# Patient Record
Sex: FEMALE | Race: OTHER | Employment: UNEMPLOYED | URBAN - METROPOLITAN AREA
[De-identification: names, ages, dates, MRNs, and addresses within clinical notes are randomized per-mention and may not be internally consistent; named-entity substitution may affect disease eponyms.]

---

## 2017-04-14 ENCOUNTER — OFFICE VISIT (OUTPATIENT)
Dept: FAMILY MEDICINE CLINIC | Age: 57
End: 2017-04-14

## 2017-04-14 VITALS
RESPIRATION RATE: 22 BRPM | HEIGHT: 63 IN | TEMPERATURE: 98.4 F | HEART RATE: 70 BPM | SYSTOLIC BLOOD PRESSURE: 160 MMHG | DIASTOLIC BLOOD PRESSURE: 98 MMHG | WEIGHT: 248.2 LBS | BODY MASS INDEX: 43.98 KG/M2 | OXYGEN SATURATION: 97 %

## 2017-04-14 DIAGNOSIS — J06.9 UPPER RESPIRATORY TRACT INFECTION, UNSPECIFIED TYPE: Primary | ICD-10-CM

## 2017-04-14 DIAGNOSIS — M17.12 PRIMARY OSTEOARTHRITIS OF LEFT KNEE: ICD-10-CM

## 2017-04-14 DIAGNOSIS — Z91.14 NONCOMPLIANCE WITH MEDICATION REGIMEN: ICD-10-CM

## 2017-04-14 DIAGNOSIS — Z91.09 ENVIRONMENTAL ALLERGIES: ICD-10-CM

## 2017-04-14 DIAGNOSIS — I10 ESSENTIAL HYPERTENSION: ICD-10-CM

## 2017-04-14 DIAGNOSIS — J45.40 MODERATE PERSISTENT ASTHMA WITHOUT COMPLICATION: ICD-10-CM

## 2017-04-14 RX ORDER — ALBUTEROL SULFATE 90 UG/1
2 AEROSOL, METERED RESPIRATORY (INHALATION)
Qty: 2 INHALER | Refills: 1 | Status: SHIPPED | OUTPATIENT
Start: 2017-04-14

## 2017-04-14 RX ORDER — BUDESONIDE AND FORMOTEROL FUMARATE DIHYDRATE 160; 4.5 UG/1; UG/1
2 AEROSOL RESPIRATORY (INHALATION) 2 TIMES DAILY
Qty: 3 INHALER | Refills: 1 | Status: SHIPPED | OUTPATIENT
Start: 2017-04-14 | End: 2017-08-04 | Stop reason: CLARIF

## 2017-04-14 RX ORDER — IBUPROFEN 800 MG/1
800 TABLET ORAL
Qty: 90 TAB | Refills: 1 | Status: SHIPPED | OUTPATIENT
Start: 2017-04-14

## 2017-04-14 RX ORDER — HYDROCODONE POLISTIREX AND CHLORPHENIRAMINE POLISTIREX 10; 8 MG/5ML; MG/5ML
5 SUSPENSION, EXTENDED RELEASE ORAL
Qty: 100 ML | Refills: 0 | Status: SHIPPED | OUTPATIENT
Start: 2017-04-14 | End: 2017-07-14 | Stop reason: ALTCHOICE

## 2017-04-14 RX ORDER — MONTELUKAST SODIUM 10 MG/1
10 TABLET ORAL DAILY
Qty: 90 TAB | Refills: 1 | Status: SHIPPED | OUTPATIENT
Start: 2017-04-14 | End: 2017-11-28 | Stop reason: SDUPTHER

## 2017-04-14 RX ORDER — LISINOPRIL AND HYDROCHLOROTHIAZIDE 12.5; 2 MG/1; MG/1
1 TABLET ORAL DAILY
Qty: 180 TAB | Refills: 1 | Status: SHIPPED | OUTPATIENT
Start: 2017-04-14 | End: 2017-07-14 | Stop reason: SDUPTHER

## 2017-04-14 RX ORDER — AMLODIPINE BESYLATE 5 MG/1
5 TABLET ORAL DAILY
Qty: 90 TAB | Refills: 1 | Status: SHIPPED | OUTPATIENT
Start: 2017-04-14 | End: 2017-11-28 | Stop reason: SDUPTHER

## 2017-04-14 RX ORDER — CLARITHROMYCIN 500 MG/1
500 TABLET, FILM COATED ORAL 2 TIMES DAILY
Qty: 20 TAB | Refills: 0 | Status: SHIPPED | OUTPATIENT
Start: 2017-04-14 | End: 2017-04-24

## 2017-04-14 NOTE — PROGRESS NOTES
Keenan Drake is a 55 y.o. female  Chief Complaint   Patient presents with    Sinus Pain     x 1 week    Gland Swelling      1 week     1. Have you been to the ER, urgent care clinic since your last visit? Hospitalized since your last visit? No    2. Have you seen or consulted any other health care providers outside of the [address] [place] [address] since your last visit? Include any pap smears or colon screening. No     Pt states that shes not been taking her BP medication because shes weening herself from it. Only medication that she is taking right now is Ibufropen.

## 2017-04-14 NOTE — LETTER
NOTIFICATION RETURN TO WORK / SCHOOL 
 
4/14/2017 8:41 AM 
 
Ms. Jake Mello [address] [address] To Whom It May Concern: 
 
Jake Mello is currently under the care of [address] [place] [address]. She will return to work/school on: 04/17/17 If there are questions or concerns please have the patient contact our office. Sincerely, Dakota Vanegas MD

## 2017-04-14 NOTE — PROGRESS NOTES
Assessment/Plan:    Daniel Rothman was seen today for sinus pain and gland swelling. Diagnoses and all orders for this visit:    Upper respiratory tract infection, unspecified type  -     chlorpheniramine-HYDROcodone (TUSSIONEX) 10-8 mg/5 mL suspension; Take 5 mL by mouth nightly as needed for Cough. Max Daily Amount: 5 mL. -     clarithromycin (BIAXIN) 500 mg tablet; Take 1 Tab by mouth two (2) times a day for 10 days. Environmental allergies  -     montelukast (SINGULAIR) 10 mg tablet; Take 1 Tab by mouth daily. Essential hypertension  -     lisinopril-hydroCHLOROthiazide (PRINZIDE, ZESTORETIC) 20-12.5 mg per tablet; Take 1 Tab by mouth daily. -     amLODIPine (NORVASC) 5 mg tablet; Take 1 Tab by mouth daily.  -     CBC WITH AUTOMATED DIFF; Future  -     HEPATIC FUNCTION PANEL; Future  -     LIPID PANEL; Future  -     METABOLIC PANEL, BASIC; Future  -     TSH 3RD GENERATION; Future  -     T4, FREE; Future  -     URINALYSIS W/ RFLX MICROSCOPIC; Future  -     VITAMIN D, 25 HYDROXY; Future    Moderate persistent asthma without complication  -     budesonide-formoterol (SYMBICORT) 160-4.5 mcg/actuation HFA inhaler; Take 2 Puffs by inhalation two (2) times a day. -     albuterol (PROVENTIL HFA, VENTOLIN HFA, PROAIR HFA) 90 mcg/actuation inhaler; Take 2 Puffs by inhalation every six (6) hours as needed for Wheezing. Primary osteoarthritis of left knee  -     ibuprofen (MOTRIN) 800 mg tablet; Take 1 Tab by mouth every eight (8) hours as needed for Pain. Pt will return for physical in May. Will be back on BP meds at that time and will recheck BP. The plan was discussed with the patient. The patient verbalized understanding and is in agreement with the plan.   All medication potential side effects were discussed with the patient.    -------------------------------------------------------------------------------------------------------------------        Victorino Maxwell is a 55 y.o. female and presents with Sinus Pain (x 1 week) and Gland Swelling ( 1 week)         Subjective: For the 3 weeks having sinus congestion, +post nasal drainage, +bad cough, + green phlegm, +fever at 102.3 earlier this week. Just using the Rx motrin for this. Not taking any other sinus meds. Noted swelling along LT side of neck. HTN: NOT well controlled because again, pt has not been compliant with her medication. She stopped taking it 3 weeks ago. Says she went on a trip and left her meds in [place]. But she never even called us to request new Rxs be sent in. She told my nurse that she was trying to \"wean\" herself off her meds. I have told her many times that she can not do this, it is unsafe and can lead to complications. Asthma: stable but needs inhalers as well. ROS:  Constitutional: No recent weight change. No weakness/fatigue. No f/c. Skin: No rashes, change in nails/hair, itching   HENT: No HA, dizziness. No hearing loss/tinnitus. No nasal congestion/discharge. Eyes: No change in vision, double/blurred vision or eye pain/redness. Cardiovascular: No CP/palpitations. No HSU/orthopnea/PND. Respiratory: No cough/sputum, dyspnea, wheezing. Gastointestinal: No dysphagia, reflux. No n/v. No constipation/diarrhea. No melena/rectal bleeding. Genitourinary: No dysuria, urinary hesitancy, nocturia, hematuria. No incontinence. Musculoskeletal: No joint pain/stiffness. No muscle pain/tenderness. Endo: No heat/cold intolerance, no polyuria/polydypsia. Heme: No h/o anemia. No easy bleeding/bruising. Allergy/Immunology: No seasonal rhinitis. Denies frequent colds, sinus/ear infections. Neurological: No seizures/numbness/weakness. No paresthesias. Psychiatric:  No depression, anxiety. The problem list was updated as a part of today's visit.   Patient Active Problem List   Diagnosis Code    Asthma J45.909    HTN (hypertension) I10    Irregular menstrual cycle N92.6    Anemia D64.9    Anxiety F41.9    Degenerative arthritis of left knee M17.12       The PSH, FH were reviewed. SH:  Social History   Substance Use Topics    Smoking status: Never Smoker    Smokeless tobacco: Never Used    Alcohol use No       Medications/Allergies:  No current outpatient prescriptions on file prior to visit. No current facility-administered medications on file prior to visit. Allergies   Allergen Reactions    Pcn [Penicillins] Rash         Health Maintenance:   Health Maintenance   Topic Date Due    COLONOSCOPY  07/09/1979    Pneumococcal 19-64 Medium Risk (1 of 1 - PPSV23) 07/09/1980    PAP AKA CERVICAL CYTOLOGY  07/09/1982    BREAST CANCER SCRN MAMMOGRAM  07/09/2011    INFLUENZA AGE 9 TO ADULT  08/01/2016    DTaP/Tdap/Td series (2 - Td) 01/06/2024       Objective:  Visit Vitals    BP (!) 160/98 (BP 1 Location: Left arm, BP Patient Position: Sitting)    Pulse 70    Temp 98.4 °F (36.9 °C) (Temporal)    Resp 22    Ht 5' 3\" (1.6 m)    Wt 248 lb 3.2 oz (112.6 kg)    SpO2 97%    BMI 43.97 kg/m2          Nurses notes and VS reviewed.       Physical Examination: General appearance - ill-appearing  Ears - bilateral TM's and external ear canals normal  Sinus pressure, tenderness along maxillary region  Mouth - erythematous  Neck - adenopathy noted left neck region  Chest - rhonchi noted scattered  Heart - normal rate and regular rhythm        Labwork and Ancillary Studies:    CBC w/Diff  Lab Results   Component Value Date/Time    WBC 5.3 06/11/2015 11:33 AM    HGB 12.1 06/11/2015 11:33 AM    PLATELET 169 09/71/3044 11:33 AM         Basic Metabolic Profile  Lab Results   Component Value Date/Time    Sodium 145 06/11/2015 11:33 AM    Potassium 4.2 06/11/2015 11:33 AM    Chloride 106 06/11/2015 11:33 AM    CO2 27 06/11/2015 11:33 AM    Anion gap 7 08/26/2010 11:36 AM    Glucose 91 06/11/2015 11:33 AM    BUN 7 06/11/2015 11:33 AM    Creatinine 0.56 06/11/2015 11:33 AM    BUN/Creatinine ratio 13 06/11/2015 11:33 AM    GFR est  06/11/2015 11:33 AM    GFR est non- 06/11/2015 11:33 AM    Calcium 9.2 06/11/2015 11:33 AM         LFT  Lab Results   Component Value Date/Time    ALT (SGPT) 17 06/11/2015 11:33 AM    AST (SGOT) 15 06/11/2015 11:33 AM    Alk.  phosphatase 71 06/11/2015 11:33 AM    Bilirubin, direct 0.10 06/11/2015 11:33 AM    Bilirubin, total 0.3 06/11/2015 11:33 AM         Cholesterol  Lab Results   Component Value Date/Time    Cholesterol, total 179 06/11/2015 11:33 AM    HDL Cholesterol 51 06/11/2015 11:33 AM    LDL, calculated 116 06/11/2015 11:33 AM    Triglyceride 60 06/11/2015 11:33 AM    CHOL/HDL Ratio 3.7 08/26/2010 11:36 AM

## 2017-04-14 NOTE — MR AVS SNAPSHOT
Visit Information Date & Time Provider Department Dept. Phone Encounter #  
 4/14/2017  8:15 AM Gloria Jane, [address] [phone] [id number] Upcoming Health Maintenance Date Due COLONOSCOPY 7/9/1979 Pneumococcal 19-64 Medium Risk (1 of 1 - PPSV23) 7/9/1980 PAP AKA CERVICAL CYTOLOGY 7/9/1982 BREAST CANCER SCRN MAMMOGRAM 7/9/2011 INFLUENZA AGE 9 TO ADULT 8/1/2016 DTaP/Tdap/Td series (2 - Td) 1/6/2024 Allergies as of 4/14/2017  Review Complete On: 4/14/2017 By: Gloria Jane MD  
  
 Severity Noted Reaction Type Reactions Pcn [Penicillins]  07/19/2010    Rash Current Immunizations  Reviewed on 7/13/2015 Name Date Tdap 1/6/2014 Not reviewed this visit You Were Diagnosed With   
  
 Codes Comments Upper respiratory tract infection, unspecified type    -  Primary ICD-10-CM: J06.9 ICD-9-CM: 465.9 Foot trauma, left, initial encounter     ICD-10-CM: H65.084M ICD-9-CM: 959.7 Environmental allergies     ICD-10-CM: Z91.09 
ICD-9-CM: V15.09 Essential hypertension     ICD-10-CM: I10 
ICD-9-CM: 401.9 Moderate persistent asthma without complication     RBP-29-SD: J45.40 ICD-9-CM: 493.90 Vitals BP Pulse Temp Resp Height(growth percentile) (!) 160/98 (BP 1 Location: Left arm, BP Patient Position: Sitting) 70 98.4 °F (36.9 °C) (Temporal) 22 5' 3\" (1.6 m) Weight(growth percentile) SpO2 BMI OB Status Smoking Status 248 lb 3.2 oz (112.6 kg) 97% 43.97 kg/m2 Having regular periods Never Smoker BMI and BSA Data Body Mass Index Body Surface Area  
 43.97 kg/m 2 2.24 m 2 Preferred Pharmacy Pharmacy Name Phone [address], [place] [facility] [phone] Your Updated Medication List  
  
   
This list is accurate as of: 4/14/17  8:44 AM.  Always use your most recent med list.  
  
  
  
  
 albuterol 90 mcg/actuation inhaler Commonly known as:  PROVENTIL HFA, VENTOLIN HFA, PROAIR HFA Take 2 Puffs by inhalation every six (6) hours as needed for Wheezing. amLODIPine 5 mg tablet Commonly known as:  Rachel Foss Take 1 Tab by mouth daily. budesonide-formoterol 160-4.5 mcg/actuation HFA inhaler Commonly known as:  SYMBICORT Take 2 Puffs by inhalation two (2) times a day. chlorpheniramine-HYDROcodone 10-8 mg/5 mL suspension Commonly known as:  Paz Imperial Take 5 mL by mouth nightly as needed for Cough. Max Daily Amount: 5 mL. clarithromycin 500 mg tablet Commonly known as:  Dago Dsouza Take 1 Tab by mouth two (2) times a day for 10 days. ibuprofen 800 mg tablet Commonly known as:  MOTRIN Take 1 Tab by mouth every eight (8) hours as needed for Pain. lisinopril-hydroCHLOROthiazide 20-12.5 mg per tablet Commonly known as:  Beula Gabriel Take 1 Tab by mouth daily. montelukast 10 mg tablet Commonly known as:  SINGULAIR Take 1 Tab by mouth daily. Prescriptions Printed Refills  
 chlorpheniramine-HYDROcodone (TUSSIONEX) 10-8 mg/5 mL suspension 0 Sig: Take 5 mL by mouth nightly as needed for Cough. Max Daily Amount: 5 mL. Class: Print Route: Oral  
  
Prescriptions Sent to Pharmacy Refills  
 ibuprofen (MOTRIN) 800 mg tablet 1 Sig: Take 1 Tab by mouth every eight (8) hours as needed for Pain. Class: Normal  
 Pharmacy: [address] [facility] [address] [facility] Ph #: [phone] Route: Oral  
 montelukast (SINGULAIR) 10 mg tablet 1 Sig: Take 1 Tab by mouth daily. Class: Normal  
 Pharmacy: [address] [facility] [address] [facility], [facility] Ph #: [phone] Route: Oral  
 lisinopril-hydroCHLOROthiazide (PRINZIDE, ZESTORETIC) 20-12.5 mg per tablet 1 Sig: Take 1 Tab by mouth daily.   
 Class: Normal  
 Pharmacy: [facility] [address] [facility] [address], [place] [address] Ph #: [phone] Route: Oral  
 amLODIPine (NORVASC) 5 mg tablet 1 Sig: Take 1 Tab by mouth daily. Class: Normal  
 [facility] [address] [facility] [address], [place] [address] Ph #: [phone] Route: Oral  
 budesonide-formoterol (SYMBICORT) 160-4.5 mcg/actuation HFA inhaler 1 Sig: Take 2 Puffs by inhalation two (2) times a day. Class: Normal  
 [facility] [address] [facility] [address], [place] [address] Ph #: [phone] Route: Inhalation  
 albuterol (PROVENTIL HFA, VENTOLIN HFA, PROAIR HFA) 90 mcg/actuation inhaler 1 Sig: Take 2 Puffs by inhalation every six (6) hours as needed for Wheezing. Class: Normal  
 [facility] [address] [facility] [address], [place] [address] Ph #: [phone] Route: Inhalation  
 clarithromycin (BIAXIN) 500 mg tablet 0 Sig: Take 1 Tab by mouth two (2) times a day for 10 days. Class: Normal  
 [facility] [address] [facility] [address], [place] [address] Ph #: [phone] Route: Oral  
  
To-Do List   
 04/14/2017 Lab:  CBC WITH AUTOMATED DIFF   
  
 04/14/2017 Lab:  HEPATIC FUNCTION PANEL   
  
 04/14/2017 Lab:  LIPID PANEL   
  
 04/14/2017 Lab:  METABOLIC PANEL, BASIC   
  
 04/14/2017 Lab:  T4, FREE   
  
 04/14/2017 Lab:  TSH 3RD GENERATION   
  
 04/14/2017 Lab:  URINALYSIS W/ RFLX MICROSCOPIC   
  
 04/14/2017 Lab:  VITAMIN D, 25 HYDROXY Patient Instructions High Blood Pressure: Care Instructions Your Care Instructions If your blood pressure is usually above 140/90, you have high blood pressure, or hypertension. That means the top number is 140 or higher or the bottom number is 90 or higher, or both.  
Despite what a lot of people think, high blood pressure usually doesn't cause headaches or make you feel dizzy or lightheaded. It usually has no symptoms. But it does increase your risk for heart attack, stroke, and kidney or eye damage. The higher your blood pressure, the more your risk increases. Your doctor will give you a goal for your blood pressure. Your goal will be based on your health and your age. An example of a goal is to keep your blood pressure below 140/90. Lifestyle changes, such as eating healthy and being active, are always important to help lower blood pressure. You might also take medicine to reach your blood pressure goal. 
Follow-up care is a key part of your treatment and safety. Be sure to make and go to all appointments, and call your doctor if you are having problems. It's also a good idea to know your test results and keep a list of the medicines you take. How can you care for yourself at home? Medical treatment · If you stop taking your medicine, your blood pressure will go back up. You may take one or more types of medicine to lower your blood pressure. Be safe with medicines. Take your medicine exactly as prescribed. Call your doctor if you think you are having a problem with your medicine. · Talk to your doctor before you start taking aspirin every day. Aspirin can help certain people lower their risk of a heart attack or stroke. But taking aspirin isn't right for everyone, because it can cause serious bleeding. · See your doctor regularly. You may need to see the doctor more often at first or until your blood pressure comes down. · If you are taking blood pressure medicine, talk to your doctor before you take decongestants or anti-inflammatory medicine, such as ibuprofen. Some of these medicines can raise blood pressure. · Learn how to check your blood pressure at home. Lifestyle changes · Stay at a healthy weight. This is especially important if you put on weight around the waist. Losing even 10 pounds can help you lower your blood pressure. · If your doctor recommends it, get more exercise. Walking is a good choice. Bit by bit, increase the amount you walk every day. Try for at least 30 minutes on most days of the week. You also may want to swim, bike, or do other activities. · Avoid or limit alcohol. Talk to your doctor about whether you can drink any alcohol. · Try to limit how much sodium you eat to less than 2,300 milligrams (mg) a day. Your doctor may ask you to try to eat less than 1,500 mg a day. · Eat plenty of fruits (such as bananas and oranges), vegetables, legumes, whole grains, and low-fat dairy products. · Lower the amount of saturated fat in your diet. Saturated fat is found in animal products such as milk, cheese, and meat. Limiting these foods may help you lose weight and also lower your risk for heart disease. · Do not smoke. Smoking increases your risk for heart attack and stroke. If you need help quitting, talk to your doctor about stop-smoking programs and medicines. These can increase your chances of quitting for good. When should you call for help? Call 911 anytime you think you may need emergency care. This may mean having symptoms that suggest that your blood pressure is causing a serious heart or blood vessel problem. Your blood pressure may be over 180/110. For example, call 911 if: 
· You have symptoms of a heart attack. These may include: ¨ Chest pain or pressure, or a strange feeling in the chest. 
¨ Sweating. ¨ Shortness of breath. ¨ Nausea or vomiting. ¨ Pain, pressure, or a strange feeling in the back, neck, jaw, or upper belly or in one or both shoulders or arms. ¨ Lightheadedness or sudden weakness. ¨ A fast or irregular heartbeat. · You have symptoms of a stroke. These may include: 
¨ Sudden numbness, tingling, weakness, or loss of movement in your face, arm, or leg, especially on only one side of your body. ¨ Sudden vision changes. ¨ Sudden trouble speaking. ¨ Sudden confusion or trouble understanding simple statements. ¨ Sudden problems with walking or balance. ¨ A sudden, severe headache that is different from past headaches. · You have severe back or belly pain. Do not wait until your blood pressure comes down on its own. Get help right away. Call your doctor now or seek immediate care if: 
· Your blood pressure is much higher than normal (such as 180/110 or higher), but you don't have symptoms. · You think high blood pressure is causing symptoms, such as: ¨ Severe headache. ¨ Blurry vision. Watch closely for changes in your health, and be sure to contact your doctor if: 
· Your blood pressure measures 140/90 or higher at least 2 times. That means the top number is 140 or higher or the bottom number is 90 or higher, or both. · You think you may be having side effects from your blood pressure medicine. · Your blood pressure is usually normal, but it goes above normal at least 2 times. Where can you learn more? Go to http://pepe-lolita.info/. Enter A354 in the search box to learn more about \"High Blood Pressure: Care Instructions. \" Current as of: August 8, 2016 Content Version: 11.2 © 5601-6436 [employer]. Care instructions adapted under license by [employer] (?????) (which disclaims liability or warranty for this information). If you have questions about a medical condition or this instruction, always ask your healthcare professional. [address] any warranty or liability for your use of this information. Introducing [facility]! Calvin Serrato introduces [employer] patient portal. Now you can access parts of your medical record, email your doctor's office, and request medication refills online. 1. In your internet browser, go to [email or web address] 2. Click on the First Time User? Click Here link in the Sign In box. You will see the New Member Sign Up page. 3. Enter your Coup[employer]ip Access Code exactly as it appears below. You will not need to use this code after youve completed the sign-up process. If you do not sign up before the expiration date, you must request a new code. · [employer] Access Code: [email or web address] Expires: 7/13/2017  8:43 AM 
 
4. Enter the last four digits of your Social Security Number (xxxx) and Date of Birth (mm/dd/yyyy) as indicated and click Submit. You will be taken to the next sign-up page. 5. Create a Contacts+t ID. This will be your CoupFlip login ID and cannot be changed, so think of one that is secure and easy to remember. 6. Create a [employer] password. You can change your password at any time. 7. Enter your Password Reset Question and Answer. This can be used at a later time if you forget your password. 8. Enter your e-mail address. You will receive e-mail notification when new information is available in [address] 9. Click Sign Up. You can now view and download portions of your medical record. 10. Click the Download Summary menu link to download a portable copy of your medical information. If you have questions, please visit the Frequently Asked Questions section of the [employer] website. Remember, [employer] is NOT to be used for urgent needs. For medical emergencies, dial 911. Now available from your iPhone and Android! Please provide this summary of care documentation to your next provider. Your primary care clinician is listed as Sally 51. If you have any questions after today's visit, please call [phone].

## 2017-04-14 NOTE — PATIENT INSTRUCTIONS

## 2017-04-19 NOTE — TELEPHONE ENCOUNTER
Per insurance patient needs to try Advair before Symbicort. Patient states she has tried before but there is no documentation.

## 2017-04-20 RX ORDER — FLUTICASONE PROPIONATE AND SALMETEROL 250; 50 UG/1; UG/1
1 POWDER RESPIRATORY (INHALATION) EVERY 12 HOURS
Qty: 3 INHALER | Refills: 2 | Status: SHIPPED | OUTPATIENT
Start: 2017-04-20 | End: 2017-08-04 | Stop reason: SDUPTHER

## 2017-04-20 NOTE — TELEPHONE ENCOUNTER
Please notify her that we do not have any documentation for advair over the last 7 years. If she wishes, she could try speaking to her insurance to let them know she has tried Advair. Otherwise, I will have to put her on a trial of Advair again.

## 2017-07-14 ENCOUNTER — OFFICE VISIT (OUTPATIENT)
Dept: FAMILY MEDICINE CLINIC | Age: 57
End: 2017-07-14

## 2017-07-14 VITALS
TEMPERATURE: 98.2 F | HEIGHT: 63 IN | DIASTOLIC BLOOD PRESSURE: 100 MMHG | OXYGEN SATURATION: 98 % | RESPIRATION RATE: 16 BRPM | SYSTOLIC BLOOD PRESSURE: 152 MMHG | BODY MASS INDEX: 42.17 KG/M2 | WEIGHT: 238 LBS | HEART RATE: 63 BPM

## 2017-07-14 DIAGNOSIS — I10 ESSENTIAL HYPERTENSION: ICD-10-CM

## 2017-07-14 DIAGNOSIS — J01.01 ACUTE RECURRENT MAXILLARY SINUSITIS: Primary | ICD-10-CM

## 2017-07-14 RX ORDER — DOXYCYCLINE 100 MG/1
100 TABLET ORAL 2 TIMES DAILY
Qty: 20 TAB | Refills: 0 | Status: SHIPPED | OUTPATIENT
Start: 2017-07-14 | End: 2017-07-24

## 2017-07-14 RX ORDER — LISINOPRIL AND HYDROCHLOROTHIAZIDE 12.5; 2 MG/1; MG/1
2 TABLET ORAL DAILY
Qty: 180 TAB | Refills: 1 | Status: SHIPPED | OUTPATIENT
Start: 2017-07-14 | End: 2017-11-28 | Stop reason: SDUPTHER

## 2017-07-14 NOTE — MR AVS SNAPSHOT
Visit Information Date & Time Provider Department Dept. Phone Encounter #  
 7/14/2017  9:30 AM Jackson Albert, 3 [facility] [phone] [id number] Upcoming Health Maintenance Date Due COLONOSCOPY 7/9/1979 Pneumococcal 19-64 Medium Risk (1 of 1 - PPSV23) 7/9/1980 PAP AKA CERVICAL CYTOLOGY 7/9/1982 BREAST CANCER SCRN MAMMOGRAM 7/9/2011 INFLUENZA AGE 9 TO ADULT 8/1/2017 DTaP/Tdap/Td series (2 - Td) 1/6/2024 Allergies as of 7/14/2017  Review Complete On: 4/14/2017 By: Jackson Albert MD  
  
 Severity Noted Reaction Type Reactions Pcn [Penicillins]  07/19/2010    Rash Current Immunizations  Reviewed on 7/13/2015 Name Date Tdap 1/6/2014 Not reviewed this visit You Were Diagnosed With   
  
 Codes Comments Acute recurrent maxillary sinusitis    -  Primary ICD-10-CM: J01.01 
ICD-9-CM: 461.0 Essential hypertension     ICD-10-CM: I10 
ICD-9-CM: 401.9 Vitals BP Pulse Temp Resp Height(growth percentile) Weight(growth percentile) (!) 152/100 63 98.2 °F (36.8 °C) 16 5' 3\" (1.6 m) 238 lb (108 kg) LMP SpO2 BMI OB Status Smoking Status 01/14/2017 98% 42.16 kg/m2 Having regular periods Never Smoker Vitals History BMI and BSA Data Body Mass Index Body Surface Area  
 42.16 kg/m 2 2.19 m 2 Preferred Pharmacy Pharmacy Name Phone [address] [facility], [facility] Dominique Trejoo [phone] Your Updated Medication List  
  
   
This list is accurate as of: 7/14/17 10:17 AM.  Always use your most recent med list.  
  
  
  
  
 albuterol 90 mcg/actuation inhaler Commonly known as:  PROVENTIL HFA, VENTOLIN HFA, PROAIR HFA Take 2 Puffs by inhalation every six (6) hours as needed for Wheezing. amLODIPine 5 mg tablet Commonly known as:  Leward Cheema Take 1 Tab by mouth daily. budesonide-formoterol 160-4.5 mcg/actuation HFA inhaler Commonly known as:  SYMBICORT Take 2 Puffs by inhalation two (2) times a day. doxycycline 100 mg tablet Commonly known as:  ADOXA Take 1 Tab by mouth two (2) times a day for 10 days. fluticasone-salmeterol 250-50 mcg/dose diskus inhaler Commonly known as:  ADVAIR DISKUS Take 1 Puff by inhalation every twelve (12) hours. ibuprofen 800 mg tablet Commonly known as:  MOTRIN Take 1 Tab by mouth every eight (8) hours as needed for Pain. lisinopril-hydroCHLOROthiazide 20-12.5 mg per tablet Commonly known as:  Fleet He Take 2 Tabs by mouth daily. montelukast 10 mg tablet Commonly known as:  SINGULAIR Take 1 Tab by mouth daily. Prescriptions Sent to Pharmacy Refills  
 lisinopril-hydroCHLOROthiazide (PRINZIDE, ZESTORETIC) 20-12.5 mg per tablet 1 Sig: Take 2 Tabs by mouth daily. Class: Normal  
 Pharmacy: [address] Ph #: [phone] Route: Oral  
 doxycycline (ADOXA) 100 mg tablet 0 Sig: Take 1 Tab by mouth two (2) times a day for 10 days. Class: Normal  
 Pharmacy: [address] [place] [address] Ph #: [phone] Route: Oral  
  
Patient Instructions High Blood Pressure: Care Instructions Your Care Instructions If your blood pressure is usually above 140/90, you have high blood pressure, or hypertension. That means the top number is 140 or higher or the bottom number is 90 or higher, or both. Despite what a lot of people think, high blood pressure usually doesn't cause headaches or make you feel dizzy or lightheaded. It usually has no symptoms. But it does increase your risk for heart attack, stroke, and kidney or eye damage. The higher your blood pressure, the more your risk increases. Your doctor will give you a goal for your blood pressure.  Your goal will be based on your health and your age. An example of a goal is to keep your blood pressure below 140/90. Lifestyle changes, such as eating healthy and being active, are always important to help lower blood pressure. You might also take medicine to reach your blood pressure goal. 
Follow-up care is a key part of your treatment and safety. Be sure to make and go to all appointments, and call your doctor if you are having problems. It's also a good idea to know your test results and keep a list of the medicines you take. How can you care for yourself at home? Medical treatment · If you stop taking your medicine, your blood pressure will go back up. You may take one or more types of medicine to lower your blood pressure. Be safe with medicines. Take your medicine exactly as prescribed. Call your doctor if you think you are having a problem with your medicine. · Talk to your doctor before you start taking aspirin every day. Aspirin can help certain people lower their risk of a heart attack or stroke. But taking aspirin isn't right for everyone, because it can cause serious bleeding. · See your doctor regularly. You may need to see the doctor more often at first or until your blood pressure comes down. · If you are taking blood pressure medicine, talk to your doctor before you take decongestants or anti-inflammatory medicine, such as ibuprofen. Some of these medicines can raise blood pressure. · Learn how to check your blood pressure at home. Lifestyle changes · Stay at a healthy weight. This is especially important if you put on weight around the waist. Losing even 10 pounds can help you lower your blood pressure. · If your doctor recommends it, get more exercise. Walking is a good choice. Bit by bit, increase the amount you walk every day. Try for at least 30 minutes on most days of the week. You also may want to swim, bike, or do other activities. · Avoid or limit alcohol. Talk to your doctor about whether you can drink any alcohol. · Try to limit how much sodium you eat to less than 2,300 milligrams (mg) a day. Your doctor may ask you to try to eat less than 1,500 mg a day. · Eat plenty of fruits (such as bananas and oranges), vegetables, legumes, whole grains, and low-fat dairy products. · Lower the amount of saturated fat in your diet. Saturated fat is found in animal products such as milk, cheese, and meat. Limiting these foods may help you lose weight and also lower your risk for heart disease. · Do not smoke. Smoking increases your risk for heart attack and stroke. If you need help quitting, talk to your doctor about stop-smoking programs and medicines. These can increase your chances of quitting for good. When should you call for help? Call 911 anytime you think you may need emergency care. This may mean having symptoms that suggest that your blood pressure is causing a serious heart or blood vessel problem. Your blood pressure may be over 180/110. For example, call 911 if: 
· You have symptoms of a heart attack. These may include: ¨ Chest pain or pressure, or a strange feeling in the chest. 
¨ Sweating. ¨ Shortness of breath. ¨ Nausea or vomiting. ¨ Pain, pressure, or a strange feeling in the back, neck, jaw, or upper belly or in one or both shoulders or arms. ¨ Lightheadedness or sudden weakness. ¨ A fast or irregular heartbeat. · You have symptoms of a stroke. These may include: 
¨ Sudden numbness, tingling, weakness, or loss of movement in your face, arm, or leg, especially on only one side of your body. ¨ Sudden vision changes. ¨ Sudden trouble speaking. ¨ Sudden confusion or trouble understanding simple statements. ¨ Sudden problems with walking or balance. ¨ A sudden, severe headache that is different from past headaches. · You have severe back or belly pain. Do not wait until your blood pressure comes down on its own. Get help right away. Call your doctor now or seek immediate care if: 
· Your blood pressure is much higher than normal (such as 180/110 or higher), but you don't have symptoms. · You think high blood pressure is causing symptoms, such as: ¨ Severe headache. ¨ Blurry vision. Watch closely for changes in your health, and be sure to contact your doctor if: 
· Your blood pressure measures 140/90 or higher at least 2 times. That means the top number is 140 or higher or the bottom number is 90 or higher, or both. · You think you may be having side effects from your blood pressure medicine. · Your blood pressure is usually normal, but it goes above normal at least 2 times. Where can you learn more? Go to http://pepe-lolita.info/. Enter T467 in the search box to learn more about \"High Blood Pressure: Care Instructions. \" Current as of: August 8, 2016 Content Version: 11.3 © 1683-9470 [employer]. Care instructions adapted under license by [employer] (which disclaims liability or warranty for this information). If you have questions about a medical condition or this instruction, always ask your healthcare professional. [place] [address] any warranty or liability for your use of this information. Introducing [facility]! [facility] introduces [employer] patient portal. Now you can access parts of your medical record, email your doctor's office, and request medication refills online. 1. In your internet browser, go to [email or web address] 2. Click on the First Time User? Click Here link in the Sign In box. You will see the New Member Sign Up page. 3. Enter your [employer] Access Code exactly as it appears below. You will not need to use this code after youve completed the sign-up process.  If you do not sign up before the expiration date, you must request a new code. 
 
· [employer] Access Code: [email or web address] Expires: 10/12/2017 10:16 AM 
 
4. Enter the last four digits of your Social Security Number (xxxx) and Date of Birth (mm/dd/yyyy) as indicated and click Submit. You will be taken to the next sign-up page. 5. Create a collegefeedt ID. This will be your [employer] login ID and cannot be changed, so think of one that is secure and easy to remember. 6. Create a [employer] password. You can change your password at any time. 7. Enter your Password Reset Question and Answer. This can be used at a later time if you forget your password. 8. Enter your e-mail address. You will receive e-mail notification when new information is available in [address] 9. Click Sign Up. You can now view and download portions of your medical record. 10. Click the Download Summary menu link to download a portable copy of your medical information. If you have questions, please visit the Frequently Asked Questions section of the [employer] website. Remember, [employer] is NOT to be used for urgent needs. For medical emergencies, dial 911. Now available from your iPhone and Android! Please provide this summary of care documentation to your next provider. Your primary care clinician is listed as Sally 51. If you have any questions after today's visit, please call [phone].

## 2017-07-14 NOTE — PATIENT INSTRUCTIONS

## 2017-07-14 NOTE — PROGRESS NOTES
Assessment/Plan:    Blanca Cota was seen today for headache. Diagnoses and all orders for this visit:    Acute recurrent maxillary sinusitis  -     doxycycline (ADOXA) 100 mg tablet; Take 1 Tab by mouth two (2) times a day for 10 days. Essential hypertension  -     lisinopril-hydroCHLOROthiazide (PRINZIDE, ZESTORETIC) 20-12.5 mg per tablet; Take 2 Tabs by mouth daily. Will return for form completion for FMLA. Will also return in 3 weeks for HTN. Will increase Prinzide to 2 tabs daily. The plan was discussed with the patient. The patient verbalized understanding and is in agreement with the plan. All medication potential side effects were discussed with the patient.    -------------------------------------------------------------------------------------------------------------------        Mg Bella is a 65 y.o. female and presents with Headache         Subjective:  Pt here for sinus headaches, pain, sinus congestion, post nasal drip x 2 weeks. Green nasal discharge. Had URI in April, treated with Biaxin and she improved. No chest symptoms, no fevers. HTN: NOT well controlled. ROS:  Constitutional: No recent weight change. No weakness/fatigue. No f/c. Skin: No rashes, change in nails/hair, itching   HENT: No HA, dizziness. No hearing loss/tinnitus. No nasal congestion/discharge. Eyes: No change in vision, double/blurred vision or eye pain/redness. Cardiovascular: No CP/palpitations. No HSU/orthopnea/PND. Respiratory: No cough/sputum, dyspnea, wheezing. Gastointestinal: No dysphagia, reflux. No n/v. No constipation/diarrhea. No melena/rectal bleeding. Genitourinary: No dysuria, urinary hesitancy, nocturia, hematuria. No incontinence. Musculoskeletal: No joint pain/stiffness. No muscle pain/tenderness. Endo: No heat/cold intolerance, no polyuria/polydypsia. Heme: No h/o anemia. No easy bleeding/bruising. Allergy/Immunology: No seasonal rhinitis.  Denies frequent colds, sinus/ear infections. Neurological: No seizures/numbness/weakness. No paresthesias. Psychiatric:  No depression, anxiety. The problem list was updated as a part of today's visit. Patient Active Problem List   Diagnosis Code    Asthma J45.909    HTN (hypertension) I10    Irregular menstrual cycle N92.6    Anemia D64.9    Anxiety F41.9    Degenerative arthritis of left knee M17.12    Noncompliance with medication regimen Z91.14       The PSH, FH were reviewed. SH:  Social History   Substance Use Topics    Smoking status: Never Smoker    Smokeless tobacco: Never Used    Alcohol use No       Medications/Allergies:  Current Outpatient Prescriptions on File Prior to Visit   Medication Sig Dispense Refill    fluticasone-salmeterol (ADVAIR DISKUS) 250-50 mcg/dose diskus inhaler Take 1 Puff by inhalation every twelve (12) hours. 3 Inhaler 2    ibuprofen (MOTRIN) 800 mg tablet Take 1 Tab by mouth every eight (8) hours as needed for Pain. 90 Tab 1    montelukast (SINGULAIR) 10 mg tablet Take 1 Tab by mouth daily. 90 Tab 1    amLODIPine (NORVASC) 5 mg tablet Take 1 Tab by mouth daily. 90 Tab 1    budesonide-formoterol (SYMBICORT) 160-4.5 mcg/actuation HFA inhaler Take 2 Puffs by inhalation two (2) times a day. 3 Inhaler 1    albuterol (PROVENTIL HFA, VENTOLIN HFA, PROAIR HFA) 90 mcg/actuation inhaler Take 2 Puffs by inhalation every six (6) hours as needed for Wheezing. 2 Inhaler 1     No current facility-administered medications on file prior to visit.          Allergies   Allergen Reactions    Pcn [Penicillins] Rash         Health Maintenance:   Health Maintenance   Topic Date Due    COLONOSCOPY  07/09/1979    Pneumococcal 19-64 Medium Risk (1 of 1 - PPSV23) 07/09/1980    PAP AKA CERVICAL CYTOLOGY  07/09/1982    BREAST CANCER SCRN MAMMOGRAM  07/09/2011    INFLUENZA AGE 9 TO ADULT  08/01/2017    DTaP/Tdap/Td series (2 - Td) 01/06/2024       Objective:  Visit Vitals    BP (!) 152/100    Pulse 63    Temp 98.2 °F (36.8 °C)    Resp 16    Ht 5' 3\" (1.6 m)    Wt 238 lb (108 kg)    LMP 01/14/2017    SpO2 98%    BMI 42.16 kg/m2          Nurses notes and VS reviewed. Physical Examination: General appearance - alert, well appearing, and in no distress  Ears - bilateral TM's and external ear canals normal  Nose - sinus tenderness noted maxillary  Mouth - erythematous  Neck - supple, no significant adenopathy  Chest - clear to auscultation, no wheezes, rales or rhonchi, symmetric air entry  Heart - normal rate, regular rhythm, normal S1, S2, no murmurs, rubs, clicks or gallops          Labwork and Ancillary Studies:    CBC w/Diff  Lab Results   Component Value Date/Time    WBC 5.3 06/11/2015 11:33 AM    HGB 12.1 06/11/2015 11:33 AM    PLATELET 371 54/36/3536 11:33 AM         Basic Metabolic Profile  Lab Results   Component Value Date/Time    Sodium 145 06/11/2015 11:33 AM    Potassium 4.2 06/11/2015 11:33 AM    Chloride 106 06/11/2015 11:33 AM    CO2 27 06/11/2015 11:33 AM    Anion gap 7 08/26/2010 11:36 AM    Glucose 91 06/11/2015 11:33 AM    BUN 7 06/11/2015 11:33 AM    Creatinine 0.56 06/11/2015 11:33 AM    BUN/Creatinine ratio 13 06/11/2015 11:33 AM    GFR est  06/11/2015 11:33 AM    GFR est non- 06/11/2015 11:33 AM    Calcium 9.2 06/11/2015 11:33 AM         LFT  Lab Results   Component Value Date/Time    ALT (SGPT) 17 06/11/2015 11:33 AM    AST (SGOT) 15 06/11/2015 11:33 AM    Alk.  phosphatase 71 06/11/2015 11:33 AM    Bilirubin, direct 0.10 06/11/2015 11:33 AM    Bilirubin, total 0.3 06/11/2015 11:33 AM         Cholesterol  Lab Results   Component Value Date/Time    Cholesterol, total 179 06/11/2015 11:33 AM    HDL Cholesterol 51 06/11/2015 11:33 AM    LDL, calculated 116 06/11/2015 11:33 AM    Triglyceride 60 06/11/2015 11:33 AM    CHOL/HDL Ratio 3.7 08/26/2010 11:36 AM

## 2017-07-14 NOTE — PROGRESS NOTES
Raymond Pinon is a 65 y.o. female here today with complaints of headaches         1. Have you been to the ER, urgent care clinic since your last visit? Hospitalized since your last visit? No    2. Have you seen or consulted any other health care providers outside of the [address] [place] [address] since your last visit? Include any pap smears or colon screening.  No

## 2017-07-20 ENCOUNTER — OFFICE VISIT (OUTPATIENT)
Dept: FAMILY MEDICINE CLINIC | Age: 57
End: 2017-07-20

## 2017-07-20 VITALS
HEART RATE: 67 BPM | BODY MASS INDEX: 41.64 KG/M2 | RESPIRATION RATE: 20 BRPM | OXYGEN SATURATION: 97 % | TEMPERATURE: 98.6 F | SYSTOLIC BLOOD PRESSURE: 128 MMHG | WEIGHT: 235 LBS | DIASTOLIC BLOOD PRESSURE: 84 MMHG | HEIGHT: 63 IN

## 2017-07-20 DIAGNOSIS — I10 ESSENTIAL HYPERTENSION: Primary | ICD-10-CM

## 2017-07-20 DIAGNOSIS — R09.81 SINUS CONGESTION: ICD-10-CM

## 2017-07-20 RX ORDER — AZELASTINE 1 MG/ML
1 SPRAY, METERED NASAL 2 TIMES DAILY
Qty: 1 BOTTLE | Refills: 1 | Status: SHIPPED | OUTPATIENT
Start: 2017-07-20

## 2017-07-20 NOTE — MR AVS SNAPSHOT
Visit Information Date & Time Provider Department Dept. Phone Encounter #  
 7/20/2017  7:45 AM Ronda Spicer [facility] [phone] [id number] Your Appointments 8/4/2017 10:00 AM  
Follow Up with Ronda Spicer MD  
[facility]) Appt Note: 3 week f/u  
 [address] [facility] [address] [phone] [address] [place] [address] [address] [facility] Upcoming Health Maintenance Date Due COLONOSCOPY 7/9/1979 Pneumococcal 19-64 Medium Risk (1 of 1 - PPSV23) 7/9/1980 PAP AKA CERVICAL CYTOLOGY 7/9/1982 BREAST CANCER SCRN MAMMOGRAM 7/9/2011 INFLUENZA AGE 9 TO ADULT 8/1/2017 DTaP/Tdap/Td series (2 - Td) 1/6/2024 Allergies as of 7/20/2017  Review Complete On: 7/20/2017 By: John Arreola, LPN Severity Noted Reaction Type Reactions Pcn [Penicillins]  07/19/2010    Rash Current Immunizations  Reviewed on 7/13/2015 Name Date Tdap 1/6/2014 Not reviewed this visit Vitals BP Pulse Temp Resp Height(growth percentile) Weight(growth percentile) (!) 142/92 67 98.6 °F (37 °C) (Oral) 20 5' 3\" (1.6 m) 235 lb (106.6 kg) LMP SpO2 BMI OB Status Smoking Status 01/14/2017 97% 41.63 kg/m2 Having regular periods Never Smoker BMI and BSA Data Body Mass Index Body Surface Area  
 41.63 kg/m 2 2.18 m 2 Preferred Pharmacy Pharmacy Name Phone [address] [facility], [place] [facility] [phone] Your Updated Medication List  
  
   
This list is accurate as of: 7/20/17  8:21 AM.  Always use your most recent med list.  
  
  
  
  
 albuterol 90 mcg/actuation inhaler Commonly known as:  PROVENTIL HFA, VENTOLIN HFA, PROAIR HFA Take 2 Puffs by inhalation every six (6) hours as needed for Wheezing. amLODIPine 5 mg tablet Commonly known as:  Vicky Cedeno Take 1 Tab by mouth daily. budesonide-formoterol 160-4.5 mcg/actuation HFA inhaler Commonly known as:  SYMBICORT Take 2 Puffs by inhalation two (2) times a day. doxycycline 100 mg tablet Commonly known as:  ADOXA Take 1 Tab by mouth two (2) times a day for 10 days. fluticasone-salmeterol 250-50 mcg/dose diskus inhaler Commonly known as:  ADVAIR DISKUS Take 1 Puff by inhalation every twelve (12) hours. ibuprofen 800 mg tablet Commonly known as:  MOTRIN Take 1 Tab by mouth every eight (8) hours as needed for Pain. lisinopril-hydroCHLOROthiazide 20-12.5 mg per tablet Commonly known as:  Ginger Kirks Take 2 Tabs by mouth daily. montelukast 10 mg tablet Commonly known as:  SINGULAIR Take 1 Tab by mouth daily. Patient Instructions High Blood Pressure: Care Instructions Your Care Instructions If your blood pressure is usually above 140/90, you have high blood pressure, or hypertension. That means the top number is 140 or higher or the bottom number is 90 or higher, or both. Despite what a lot of people think, high blood pressure usually doesn't cause headaches or make you feel dizzy or lightheaded. It usually has no symptoms. But it does increase your risk for heart attack, stroke, and kidney or eye damage. The higher your blood pressure, the more your risk increases. Your doctor will give you a goal for your blood pressure. Your goal will be based on your health and your age. An example of a goal is to keep your blood pressure below 140/90. Lifestyle changes, such as eating healthy and being active, are always important to help lower blood pressure. You might also take medicine to reach your blood pressure goal. 
Follow-up care is a key part of your treatment and safety. Be sure to make and go to all appointments, and call your doctor if you are having problems.  It's also a good idea to know your test results and keep a list of the medicines you take. How can you care for yourself at home? Medical treatment · If you stop taking your medicine, your blood pressure will go back up. You may take one or more types of medicine to lower your blood pressure. Be safe with medicines. Take your medicine exactly as prescribed. Call your doctor if you think you are having a problem with your medicine. · Talk to your doctor before you start taking aspirin every day. Aspirin can help certain people lower their risk of a heart attack or stroke. But taking aspirin isn't right for everyone, because it can cause serious bleeding. · See your doctor regularly. You may need to see the doctor more often at first or until your blood pressure comes down. · If you are taking blood pressure medicine, talk to your doctor before you take decongestants or anti-inflammatory medicine, such as ibuprofen. Some of these medicines can raise blood pressure. · Learn how to check your blood pressure at home. Lifestyle changes · Stay at a healthy weight. This is especially important if you put on weight around the waist. Losing even 10 pounds can help you lower your blood pressure. · If your doctor recommends it, get more exercise. Walking is a good choice. Bit by bit, increase the amount you walk every day. Try for at least 30 minutes on most days of the week. You also may want to swim, bike, or do other activities. · Avoid or limit alcohol. Talk to your doctor about whether you can drink any alcohol. · Try to limit how much sodium you eat to less than 2,300 milligrams (mg) a day. Your doctor may ask you to try to eat less than 1,500 mg a day. · Eat plenty of fruits (such as bananas and oranges), vegetables, legumes, whole grains, and low-fat dairy products. · Lower the amount of saturated fat in your diet. Saturated fat is found in animal products such as milk, cheese, and meat.  Limiting these foods may help you lose weight and also lower your risk for heart disease. · Do not smoke. Smoking increases your risk for heart attack and stroke. If you need help quitting, talk to your doctor about stop-smoking programs and medicines. These can increase your chances of quitting for good. When should you call for help? Call 911 anytime you think you may need emergency care. This may mean having symptoms that suggest that your blood pressure is causing a serious heart or blood vessel problem. Your blood pressure may be over 180/110. For example, call 911 if: 
· You have symptoms of a heart attack. These may include: ¨ Chest pain or pressure, or a strange feeling in the chest. 
¨ Sweating. ¨ Shortness of breath. ¨ Nausea or vomiting. ¨ Pain, pressure, or a strange feeling in the back, neck, jaw, or upper belly or in one or both shoulders or arms. ¨ Lightheadedness or sudden weakness. ¨ A fast or irregular heartbeat. · You have symptoms of a stroke. These may include: 
¨ Sudden numbness, tingling, weakness, or loss of movement in your face, arm, or leg, especially on only one side of your body. ¨ Sudden vision changes. ¨ Sudden trouble speaking. ¨ Sudden confusion or trouble understanding simple statements. ¨ Sudden problems with walking or balance. ¨ A sudden, severe headache that is different from past headaches. · You have severe back or belly pain. Do not wait until your blood pressure comes down on its own. Get help right away. Call your doctor now or seek immediate care if: 
· Your blood pressure is much higher than normal (such as 180/110 or higher), but you don't have symptoms. · You think high blood pressure is causing symptoms, such as: ¨ Severe headache. ¨ Blurry vision. Watch closely for changes in your health, and be sure to contact your doctor if: 
· Your blood pressure measures 140/90 or higher at least 2 times.  That means the top number is 140 or higher or the bottom number is 90 or higher, or both. · You think you may be having side effects from your blood pressure medicine. · Your blood pressure is usually normal, but it goes above normal at least 2 times. Where can you learn more? Go to http://pepe-lolita.info/. Enter E190 in the search box to learn more about \"High Blood Pressure: Care Instructions. \" Current as of: August 8, 2016 Content Version: 11.3 © 3050-2229 [employer]oll[employer]. Care instructions adapted under license by [employer] (which disclaims liability or warranty for this information). If you have questions about a medical condition or this instruction, always ask your healthcare professional. [address] any warranty or liability for your use of this information. Introducing \[facility]\"" & HEALTH SERVICES! Ronda Funes introduces Quora patient portal. Now you can access parts of your medical record, email your doctor's office, and request medication refills online. 1. In your internet browser, go to [email or web address] 2. Click on the First Time User? Click Here link in the Sign In box. You will see the New Member Sign Up page. 3. Enter your Quora Access Code exactly as it appears below. You will not need to use this code after youve completed the sign-up process. If you do not sign up before the expiration date, you must request a new code. · Quora Access Code: [email or web address] Expires: 10/12/2017 10:16 AM 
 
4. Enter the last four digits of your Social Security Number (xxxx) and Date of Birth (mm/dd/yyyy) as indicated and click Submit. You will be taken to the next sign-up page. 5. Create a Quora ID. This will be your Quora login ID and cannot be changed, so think of one that is secure and easy to remember. 6. Create a Quora password. You can change your password at any time. 7. Enter your Password Reset Question and Answer.  This can be used at a later time if you forget your password. 8. Enter your e-mail address. You will receive e-mail notification when new information is available in [address] 9. Click Sign Up. You can now view and download portions of your medical record. 10. Click the Download Summary menu link to download a portable copy of your medical information. If you have questions, please visit the Frequently Asked Questions section of the Cookstr website. Remember, Cook[employer] is NOT to be used for urgent needs. For medical emergencies, dial 911. Now available from your iPhone and Android! Please provide this summary of care documentation to your next provider. Your primary care clinician is listed as Sally 51. If you have any questions after today's visit, please call [phone].

## 2017-07-20 NOTE — PROGRESS NOTES
Gino Tovar is a 65 y.o. female  Patient here for follow up for [place] paper work      1. Have you been to the ER, urgent care clinic since your last visit? Hospitalized since your last visit? No    2. Have you seen or consulted any other health care providers outside of the [facility] since your last visit? Include any pap smears or colon screening.  No

## 2017-07-20 NOTE — PATIENT INSTRUCTIONS

## 2017-07-20 NOTE — PROGRESS NOTES
Assessment/Plan:    Deon Ellison was seen today for follow-up. Diagnoses and all orders for this visit:    Essential hypertension    Sinus congestion    Other orders  -     azelastine (ASTELIN) 137 mcg (0.1 %) nasal spray; 1 [place] by Both Nostrils route two (2) times a day. Use in each nostril as directed        The plan was discussed with the patient. The patient verbalized understanding and is in agreement with the plan. All medication potential side effects were discussed with the patient.    -------------------------------------------------------------------------------------------------------------------        Bri Ochoa is a 65 y.o. female and presents with Follow-up         Subjective:  Pt here for completion of FMLA paperwork. Still has some sinus pressure, completed Abx. ROS:  Constitutional: No recent weight change. No weakness/fatigue. No f/c. Skin: No rashes, change in nails/hair, itching   HENT: No HA, dizziness. No hearing loss/tinnitus. No nasal congestion/discharge. Eyes: No change in vision, double/blurred vision or eye pain/redness. Cardiovascular: No CP/palpitations. No HSU/orthopnea/PND. Respiratory: No cough/sputum, dyspnea, wheezing. Gastointestinal: No dysphagia, reflux. No n/v. No constipation/diarrhea. No melena/rectal bleeding. Genitourinary: No dysuria, urinary hesitancy, nocturia, hematuria. No incontinence. Musculoskeletal: No joint pain/stiffness. No muscle pain/tenderness. Endo: No heat/cold intolerance, no polyuria/polydypsia. Heme: No h/o anemia. No easy bleeding/bruising. Allergy/Immunology: No seasonal rhinitis. Denies frequent colds, sinus/ear infections. Neurological: No seizures/numbness/weakness. No paresthesias. Psychiatric:  No depression, anxiety. The problem list was updated as a part of today's visit.   Patient Active Problem List   Diagnosis Code    Asthma J45.909    HTN (hypertension) I10    Irregular menstrual cycle N92.6    Anemia D64.9    Anxiety F41.9    Degenerative arthritis of left knee M17.12    Noncompliance with medication regimen Z91.14       The PSH, FH were reviewed. SH:  Social History   Substance Use Topics    Smoking status: Never Smoker    Smokeless tobacco: Never Used    Alcohol use No       Medications/Allergies:  Current Outpatient Prescriptions on File Prior to Visit   Medication Sig Dispense Refill    lisinopril-hydroCHLOROthiazide (PRINZIDE, ZESTORETIC) 20-12.5 mg per tablet Take 2 Tabs by mouth daily. 180 Tab 1    doxycycline (ADOXA) 100 mg tablet Take 1 Tab by mouth two (2) times a day for 10 days. 20 Tab 0    fluticasone-salmeterol (ADVAIR DISKUS) 250-50 mcg/dose diskus inhaler Take 1 Puff by inhalation every twelve (12) hours. 3 Inhaler 2    ibuprofen (MOTRIN) 800 mg tablet Take 1 Tab by mouth every eight (8) hours as needed for Pain. 90 Tab 1    montelukast (SINGULAIR) 10 mg tablet Take 1 Tab by mouth daily. 90 Tab 1    amLODIPine (NORVASC) 5 mg tablet Take 1 Tab by mouth daily. 90 Tab 1    budesonide-formoterol (SYMBICORT) 160-4.5 mcg/actuation HFA inhaler Take 2 Puffs by inhalation two (2) times a day. 3 Inhaler 1    albuterol (PROVENTIL HFA, VENTOLIN HFA, PROAIR HFA) 90 mcg/actuation inhaler Take 2 Puffs by inhalation every six (6) hours as needed for Wheezing. 2 Inhaler 1     No current facility-administered medications on file prior to visit.          Allergies   Allergen Reactions    Pcn [Penicillins] Rash         Health Maintenance:   Health Maintenance   Topic Date Due    COLONOSCOPY  07/09/1979    Pneumococcal 19-64 Medium Risk (1 of 1 - PPSV23) 07/09/1980    PAP AKA CERVICAL CYTOLOGY  07/09/1982    BREAST CANCER SCRN MAMMOGRAM  07/09/2011    INFLUENZA AGE 9 TO ADULT  08/01/2017    DTaP/Tdap/Td series (2 - Td) 01/06/2024       Objective:  Visit Vitals    BP (!) 142/92    Pulse 67    Temp 98.6 °F (37 °C) (Oral)    Resp 20    Ht 5' 3\" (1.6 m)    Wt 235 lb (106.6 kg)    LMP 01/14/2017    SpO2 97%    BMI 41.63 kg/m2          Nurses notes and VS reviewed. Physical Examination: General appearance - alert, well appearing, and in no distress        Labwork and Ancillary Studies:    CBC w/Diff  Lab Results   Component Value Date/Time    WBC 5.3 06/11/2015 11:33 AM    HGB 12.1 06/11/2015 11:33 AM    PLATELET 359 33/74/5709 11:33 AM         Basic Metabolic Profile  Lab Results   Component Value Date/Time    Sodium 145 06/11/2015 11:33 AM    Potassium 4.2 06/11/2015 11:33 AM    Chloride 106 06/11/2015 11:33 AM    CO2 27 06/11/2015 11:33 AM    Anion gap 7 08/26/2010 11:36 AM    Glucose 91 06/11/2015 11:33 AM    BUN 7 06/11/2015 11:33 AM    Creatinine 0.56 06/11/2015 11:33 AM    BUN/Creatinine ratio 13 06/11/2015 11:33 AM    GFR est  06/11/2015 11:33 AM    GFR est non- 06/11/2015 11:33 AM    Calcium 9.2 06/11/2015 11:33 AM         LFT  Lab Results   Component Value Date/Time    ALT (SGPT) 17 06/11/2015 11:33 AM    AST (SGOT) 15 06/11/2015 11:33 AM    Alk.  phosphatase 71 06/11/2015 11:33 AM    Bilirubin, direct 0.10 06/11/2015 11:33 AM    Bilirubin, total 0.3 06/11/2015 11:33 AM         Cholesterol  Lab Results   Component Value Date/Time    Cholesterol, total 179 06/11/2015 11:33 AM    HDL Cholesterol 51 06/11/2015 11:33 AM    LDL, calculated 116 06/11/2015 11:33 AM    Triglyceride 60 06/11/2015 11:33 AM    CHOL/HDL Ratio 3.7 08/26/2010 11:36 AM

## 2017-08-04 ENCOUNTER — HOSPITAL ENCOUNTER (OUTPATIENT)
Dept: LAB | Age: 57
Discharge: HOME | End: 2017-08-04
Payer: OTHER GOVERNMENT

## 2017-08-04 ENCOUNTER — OFFICE VISIT (OUTPATIENT)
Dept: FAMILY MEDICINE CLINIC | Age: 57
End: 2017-08-04

## 2017-08-04 VITALS
SYSTOLIC BLOOD PRESSURE: 142 MMHG | TEMPERATURE: 98.5 F | BODY MASS INDEX: 41.92 KG/M2 | HEART RATE: 73 BPM | WEIGHT: 236.6 LBS | HEIGHT: 63 IN | DIASTOLIC BLOOD PRESSURE: 90 MMHG | OXYGEN SATURATION: 98 % | RESPIRATION RATE: 20 BRPM

## 2017-08-04 DIAGNOSIS — I10 ESSENTIAL HYPERTENSION: Primary | ICD-10-CM

## 2017-08-04 DIAGNOSIS — I10 ESSENTIAL HYPERTENSION: ICD-10-CM

## 2017-08-04 LAB
25(OH)D3 SERPL-MCNC: 20.8 NG/ML (ref 30–100)
ALBUMIN SERPL BCP-MCNC: 3.3 G/DL (ref 3.4–5)
ALBUMIN/GLOB SERPL: 0.8 {RATIO} (ref 0.8–1.7)
ALP SERPL-CCNC: 63 U/L (ref 45–117)
ALT SERPL-CCNC: 21 U/L (ref 13–56)
ANION GAP BLD CALC-SCNC: 8 MMOL/L (ref 3–18)
APPEARANCE UR: CLEAR
AST SERPL W P-5'-P-CCNC: 14 U/L (ref 15–37)
BACTERIA URNS QL MICRO: NEGATIVE /HPF
BASOPHILS # BLD AUTO: 0 K/UL (ref 0–0.06)
BASOPHILS # BLD: 1 % (ref 0–2)
BILIRUB DIRECT SERPL-MCNC: 0.1 MG/DL (ref 0–0.2)
BILIRUB SERPL-MCNC: 0.4 MG/DL (ref 0.2–1)
BILIRUB UR QL: NEGATIVE
BUN SERPL-MCNC: 9 MG/DL (ref 7–18)
BUN/CREAT SERPL: 14 (ref 12–20)
CALCIUM SERPL-MCNC: 9 MG/DL (ref 8.5–10.1)
CHLORIDE SERPL-SCNC: 104 MMOL/L (ref 100–108)
CHOLEST SERPL-MCNC: 164 MG/DL
CO2 SERPL-SCNC: 30 MMOL/L (ref 21–32)
COLOR UR: YELLOW
CREAT SERPL-MCNC: 0.64 MG/DL (ref 0.6–1.3)
DIFFERENTIAL METHOD BLD: ABNORMAL
EOSINOPHIL # BLD: 0.1 K/UL (ref 0–0.4)
EOSINOPHIL NFR BLD: 2 % (ref 0–5)
EPITH CASTS URNS QL MICRO: NEGATIVE /LPF (ref 0–5)
ERYTHROCYTE [DISTWIDTH] IN BLOOD BY AUTOMATED COUNT: 14.2 % (ref 11.6–14.5)
GLOBULIN SER CALC-MCNC: 3.9 G/DL (ref 2–4)
GLUCOSE SERPL-MCNC: 102 MG/DL (ref 74–99)
GLUCOSE UR STRIP.AUTO-MCNC: NEGATIVE MG/DL
HCT VFR BLD AUTO: 41.5 % (ref 35–45)
HDLC SERPL-MCNC: 55 MG/DL (ref 40–60)
HDLC SERPL: 3 {RATIO} (ref 0–5)
HGB BLD-MCNC: 13.4 G/DL (ref 12–16)
HGB UR QL STRIP: NEGATIVE
KETONES UR QL STRIP.AUTO: NEGATIVE MG/DL
LDLC SERPL CALC-MCNC: 95.2 MG/DL (ref 0–100)
LEUKOCYTE ESTERASE UR QL STRIP.AUTO: NEGATIVE
LIPID PROFILE,FLP: NORMAL
LYMPHOCYTES # BLD AUTO: 38 % (ref 21–52)
LYMPHOCYTES # BLD: 1.5 K/UL (ref 0.9–3.6)
MCH RBC QN AUTO: 30.4 PG (ref 24–34)
MCHC RBC AUTO-ENTMCNC: 32.3 G/DL (ref 31–37)
MCV RBC AUTO: 94.1 FL (ref 74–97)
MONOCYTES # BLD: 0.5 K/UL (ref 0.05–1.2)
MONOCYTES NFR BLD AUTO: 12 % (ref 3–10)
NEUTS SEG # BLD: 1.8 K/UL (ref 1.8–8)
NEUTS SEG NFR BLD AUTO: 47 % (ref 40–73)
NITRITE UR QL STRIP.AUTO: NEGATIVE
PH UR STRIP: 7.5 [PH] (ref 5–8)
PLATELET # BLD AUTO: 256 K/UL (ref 135–420)
PMV BLD AUTO: 12.6 FL (ref 9.2–11.8)
POTASSIUM SERPL-SCNC: 4.2 MMOL/L (ref 3.5–5.5)
PROT SERPL-MCNC: 7.2 G/DL (ref 6.4–8.2)
PROT UR STRIP-MCNC: ABNORMAL MG/DL
RBC # BLD AUTO: 4.41 M/UL (ref 4.2–5.3)
RBC #/AREA URNS HPF: NEGATIVE /HPF (ref 0–5)
SODIUM SERPL-SCNC: 142 MMOL/L (ref 136–145)
SP GR UR REFRACTOMETRY: 1.02 (ref 1–1.03)
T4 FREE SERPL-MCNC: 1 NG/DL (ref 0.7–1.5)
TRIGL SERPL-MCNC: 69 MG/DL (ref ?–150)
TSH SERPL DL<=0.05 MIU/L-ACNC: 0.83 UIU/ML (ref 0.36–3.74)
UROBILINOGEN UR QL STRIP.AUTO: 1 EU/DL (ref 0.2–1)
VLDLC SERPL CALC-MCNC: 13.8 MG/DL
WBC # BLD AUTO: 3.9 K/UL (ref 4.6–13.2)
WBC URNS QL MICRO: NEGATIVE /HPF (ref 0–4)

## 2017-08-04 PROCEDURE — 80076 HEPATIC FUNCTION PANEL: CPT | Performed by: INTERNAL MEDICINE

## 2017-08-04 PROCEDURE — 80048 BASIC METABOLIC PNL TOTAL CA: CPT | Performed by: INTERNAL MEDICINE

## 2017-08-04 PROCEDURE — 80061 LIPID PANEL: CPT | Performed by: INTERNAL MEDICINE

## 2017-08-04 PROCEDURE — 84443 ASSAY THYROID STIM HORMONE: CPT | Performed by: INTERNAL MEDICINE

## 2017-08-04 PROCEDURE — 81001 URINALYSIS AUTO W/SCOPE: CPT | Performed by: INTERNAL MEDICINE

## 2017-08-04 PROCEDURE — 84439 ASSAY OF FREE THYROXINE: CPT | Performed by: INTERNAL MEDICINE

## 2017-08-04 PROCEDURE — 85025 COMPLETE CBC W/AUTO DIFF WBC: CPT | Performed by: INTERNAL MEDICINE

## 2017-08-04 PROCEDURE — 36415 COLL VENOUS BLD VENIPUNCTURE: CPT | Performed by: INTERNAL MEDICINE

## 2017-08-04 PROCEDURE — 82306 VITAMIN D 25 HYDROXY: CPT | Performed by: INTERNAL MEDICINE

## 2017-08-04 RX ORDER — FLUTICASONE PROPIONATE AND SALMETEROL 250; 50 UG/1; UG/1
1 POWDER RESPIRATORY (INHALATION) EVERY 12 HOURS
Qty: 3 INHALER | Refills: 2 | Status: SHIPPED | OUTPATIENT
Start: 2017-08-04

## 2017-08-04 NOTE — PROGRESS NOTES
Assessment/Plan:    *Diagnoses and all orders for this visit:    1. Essential hypertension    Other orders  -     fluticasone-salmeterol (ADVAIR DISKUS) 250-50 mcg/dose diskus inhaler; Take 1 Puff by inhalation every twelve (12) hours. Will return for physical in 4 weeks. Plans to leave for [facility] at the end of Sept.    The plan was discussed with the patient. The patient verbalized understanding and is in agreement with the plan. All medication potential side effects were discussed with the patient.    -------------------------------------------------------------------------------------------------------------------        Saqib Viera is a 65 y.o. female and presents with Hypertension         Subjective:  HTN: here for f/u. Is stable. On Prinzide and Norvasc. She has not started exercising yet and needs to get her weight down. I have asked her to start tomorrow. ROS:  Constitutional: No recent weight change. No weakness/fatigue. No f/c. Skin: No rashes, change in nails/hair, itching   HENT: No HA, dizziness. No hearing loss/tinnitus. No nasal congestion/discharge. Eyes: No change in vision, double/blurred vision or eye pain/redness. Cardiovascular: No CP/palpitations. No HSU/orthopnea/PND. Respiratory: No cough/sputum, dyspnea, wheezing. Gastointestinal: No dysphagia, reflux. No n/v. No constipation/diarrhea. No melena/rectal bleeding. Genitourinary: No dysuria, urinary hesitancy, nocturia, hematuria. No incontinence. Musculoskeletal: No joint pain/stiffness. No muscle pain/tenderness. Endo: No heat/cold intolerance, no polyuria/polydypsia. Heme: No h/o anemia. No easy bleeding/bruising. Allergy/Immunology: No seasonal rhinitis. Denies frequent colds, sinus/ear infections. Neurological: No seizures/numbness/weakness. No paresthesias. Psychiatric:  No depression, anxiety. The problem list was updated as a part of today's visit.   Patient Active Problem List Diagnosis Code    Asthma J45.909    HTN (hypertension) I10    Irregular menstrual cycle N92.6    Anemia D64.9    Anxiety F41.9    Degenerative arthritis of left knee M17.12    Noncompliance with medication regimen Z91.14       The PSH, FH were reviewed. SH:  Social History   Substance Use Topics    Smoking status: Never Smoker    Smokeless tobacco: Never Used    Alcohol use No       Medications/Allergies:  Current Outpatient Prescriptions on File Prior to Visit   Medication Sig Dispense Refill    azelastine (ASTELIN) 137 mcg (0.1 %) nasal spray 1 [place] by Both Nostrils route two (2) times a day. Use in each nostril as directed 1 Bottle 1    lisinopril-hydroCHLOROthiazide (PRINZIDE, ZESTORETIC) 20-12.5 mg per tablet Take 2 Tabs by mouth daily. 180 Tab 1    ibuprofen (MOTRIN) 800 mg tablet Take 1 Tab by mouth every eight (8) hours as needed for Pain. 90 Tab 1    montelukast (SINGULAIR) 10 mg tablet Take 1 Tab by mouth daily. 90 Tab 1    amLODIPine (NORVASC) 5 mg tablet Take 1 Tab by mouth daily. 90 Tab 1    albuterol (PROVENTIL HFA, VENTOLIN HFA, PROAIR HFA) 90 mcg/actuation inhaler Take 2 Puffs by inhalation every six (6) hours as needed for Wheezing. 2 Inhaler 1     No current facility-administered medications on file prior to visit. Allergies   Allergen Reactions    Pcn [Penicillins] Rash         Health Maintenance:   Health Maintenance   Topic Date Due    COLONOSCOPY  07/09/1979    Pneumococcal 19-64 Medium Risk (1 of 1 - PPSV23) 07/09/1980    PAP AKA CERVICAL CYTOLOGY  07/09/1982    BREAST CANCER SCRN MAMMOGRAM  07/09/2011    INFLUENZA AGE 9 TO ADULT  08/01/2017    DTaP/Tdap/Td series (2 - Td) 01/06/2024       Objective:  Visit Vitals    /90    Pulse 73    Temp 98.5 °F (36.9 °C)    Resp 20    Ht 5' 3\" (1.6 m)    Wt 236 lb 9.6 oz (107.3 kg)    LMP 01/14/2017    SpO2 98%    BMI 41.91 kg/m2          Nurses notes and VS reviewed.       Physical Examination: General appearance - alert, well appearing, and in no distress        Labwork and Ancillary Studies:    CBC w/Diff  Lab Results   Component Value Date/Time    WBC 5.3 06/11/2015 11:33 AM    HGB 12.1 06/11/2015 11:33 AM    PLATELET 612 05/54/0331 11:33 AM         Basic Metabolic Profile  Lab Results   Component Value Date/Time    Sodium 145 06/11/2015 11:33 AM    Potassium 4.2 06/11/2015 11:33 AM    Chloride 106 06/11/2015 11:33 AM    CO2 27 06/11/2015 11:33 AM    Anion gap 7 08/26/2010 11:36 AM    Glucose 91 06/11/2015 11:33 AM    BUN 7 06/11/2015 11:33 AM    Creatinine 0.56 06/11/2015 11:33 AM    BUN/Creatinine ratio 13 06/11/2015 11:33 AM    GFR est  06/11/2015 11:33 AM    GFR est non- 06/11/2015 11:33 AM    Calcium 9.2 06/11/2015 11:33 AM         LFT  Lab Results   Component Value Date/Time    ALT (SGPT) 17 06/11/2015 11:33 AM    AST (SGOT) 15 06/11/2015 11:33 AM    Alk.  phosphatase 71 06/11/2015 11:33 AM    Bilirubin, direct 0.10 06/11/2015 11:33 AM    Bilirubin, total 0.3 06/11/2015 11:33 AM         Cholesterol  Lab Results   Component Value Date/Time    Cholesterol, total 179 06/11/2015 11:33 AM    HDL Cholesterol 51 06/11/2015 11:33 AM    LDL, calculated 116 06/11/2015 11:33 AM    Triglyceride 60 06/11/2015 11:33 AM    CHOL/HDL Ratio 3.7 08/26/2010 11:36 AM

## 2017-08-04 NOTE — PROGRESS NOTES
Eduardo Fernandez is a 65 y.o. female here today to follow up for HTN        1. Have you been to the ER, urgent care clinic since your last visit? Hospitalized since your last visit? No    2. Have you seen or consulted any other health care providers outside of the [address] [place] [address] since your last visit? Include any pap smears or colon screening.  No

## 2017-08-04 NOTE — PATIENT INSTRUCTIONS

## 2017-08-04 NOTE — MR AVS SNAPSHOT
Visit Information Date & Time Provider Department Dept. Phone Encounter #  
 8/4/2017 10:00 AM Kleber Wallis, Ramandeep [facility] [phone] [id number] Upcoming Health Maintenance Date Due COLONOSCOPY 7/9/1979 Pneumococcal 19-64 Medium Risk (1 of 1 - PPSV23) 7/9/1980 PAP AKA CERVICAL CYTOLOGY 7/9/1982 BREAST CANCER SCRN MAMMOGRAM 7/9/2011 INFLUENZA AGE 9 TO ADULT 8/1/2017 DTaP/Tdap/Td series (2 - Td) 1/6/2024 Allergies as of 8/4/2017  Review Complete On: 8/4/2017 By: Russell Rico LPN Severity Noted Reaction Type Reactions Pcn [Penicillins]  07/19/2010    Rash Current Immunizations  Reviewed on 7/13/2015 Name Date Tdap 1/6/2014 Not reviewed this visit You Were Diagnosed With   
  
 Codes Comments Essential hypertension    -  Primary ICD-10-CM: I10 
ICD-9-CM: 401.9 Vitals BP Pulse Temp Resp Height(growth percentile) Weight(growth percentile) 142/90 73 98.5 °F (36.9 °C) 20 5' 3\" (1.6 m) 236 lb 9.6 oz (107.3 kg) LMP SpO2 BMI OB Status Smoking Status 01/14/2017 98% 41.91 kg/m2 Menopause Never Smoker Vitals History BMI and BSA Data Body Mass Index Body Surface Area 41.91 kg/m 2 2.18 m 2 Preferred Pharmacy Pharmacy Name Phone [address], [facility] [phone] Your Updated Medication List  
  
   
This list is accurate as of: 8/4/17 10:42 AM.  Always use your most recent med list.  
  
  
  
  
 albuterol 90 mcg/actuation inhaler Commonly known as:  PROVENTIL HFA, VENTOLIN HFA, PROAIR HFA Take 2 Puffs by inhalation every six (6) hours as needed for Wheezing. amLODIPine 5 mg tablet Commonly known as:  Skip Newcomer Take 1 Tab by mouth daily. azelastine 137 mcg (0.1 %) nasal spray Commonly known as:  ASTELIN  
1 Spray by Both Nostrils route two (2) times a day. Use in each nostril as directed fluticasone-salmeterol 250-50 mcg/dose diskus inhaler Commonly known as:  ADVAIR DISKUS Take 1 Puff by inhalation every twelve (12) hours. ibuprofen 800 mg tablet Commonly known as:  MOTRIN Take 1 Tab by mouth every eight (8) hours as needed for Pain. lisinopril-hydroCHLOROthiazide 20-12.5 mg per tablet Commonly known as:  Myra Brick Take 2 Tabs by mouth daily. montelukast 10 mg tablet Commonly known as:  SINGULAIR Take 1 Tab by mouth daily. Prescriptions Sent to Pharmacy Refills  
 fluticasone-salmeterol (ADVAIR DISKUS) 250-50 mcg/dose diskus inhaler 2 Sig: Take 1 Puff by inhalation every twelve (12) hours. Class: Normal  
 Pharmacy: [address] [place] [address], Diego Hu Jovanna Dwyer  #: [phone] Route: Inhalation Patient Instructions High Blood Pressure: Care Instructions Your Care Instructions If your blood pressure is usually above 140/90, you have high blood pressure, or hypertension. That means the top number is 140 or higher or the bottom number is 90 or higher, or both. Despite what a lot of people think, high blood pressure usually doesn't cause headaches or make you feel dizzy or lightheaded. It usually has no symptoms. But it does increase your risk for heart attack, stroke, and kidney or eye damage. The higher your blood pressure, the more your risk increases. Your doctor will give you a goal for your blood pressure. Your goal will be based on your health and your age. An example of a goal is to keep your blood pressure below 140/90. Lifestyle changes, such as eating healthy and being active, are always important to help lower blood pressure. You might also take medicine to reach your blood pressure goal. 
Follow-up care is a key part of your treatment and safety.  Be sure to make and go to all appointments, and call your doctor if you are having problems. It's also a good idea to know your test results and keep a list of the medicines you take. How can you care for yourself at home? Medical treatment · If you stop taking your medicine, your blood pressure will go back up. You may take one or more types of medicine to lower your blood pressure. Be safe with medicines. Take your medicine exactly as prescribed. Call your doctor if you think you are having a problem with your medicine. · Talk to your doctor before you start taking aspirin every day. Aspirin can help certain people lower their risk of a heart attack or stroke. But taking aspirin isn't right for everyone, because it can cause serious bleeding. · See your doctor regularly. You may need to see the doctor more often at first or until your blood pressure comes down. · If you are taking blood pressure medicine, talk to your doctor before you take decongestants or anti-inflammatory medicine, such as ibuprofen. Some of these medicines can raise blood pressure. · Learn how to check your blood pressure at home. Lifestyle changes · Stay at a healthy weight. This is especially important if you put on weight around the waist. Losing even 10 pounds can help you lower your blood pressure. · If your doctor recommends it, get more exercise. Walking is a good choice. Bit by bit, increase the amount you walk every day. Try for at least 30 minutes on most days of the week. You also may want to swim, bike, or do other activities. · Avoid or limit alcohol. Talk to your doctor about whether you can drink any alcohol. · Try to limit how much sodium you eat to less than 2,300 milligrams (mg) a day. Your doctor may ask you to try to eat less than 1,500 mg a day. · Eat plenty of fruits (such as bananas and oranges), vegetables, legumes, whole grains, and low-fat dairy products. · Lower the amount of saturated fat in your diet.  Saturated fat is found in animal products such as milk, cheese, and meat. Limiting these foods may help you lose weight and also lower your risk for heart disease. · Do not smoke. Smoking increases your risk for heart attack and stroke. If you need help quitting, talk to your doctor about stop-smoking programs and medicines. These can increase your chances of quitting for good. When should you call for help? Call 911 anytime you think you may need emergency care. This may mean having symptoms that suggest that your blood pressure is causing a serious heart or blood vessel problem. Your blood pressure may be over 180/110. For example, call 911 if: 
· You have symptoms of a heart attack. These may include: ¨ Chest pain or pressure, or a strange feeling in the chest. 
¨ Sweating. ¨ Shortness of breath. ¨ Nausea or vomiting. ¨ Pain, pressure, or a strange feeling in the back, neck, jaw, or upper belly or in one or both shoulders or arms. ¨ Lightheadedness or sudden weakness. ¨ A fast or irregular heartbeat. · You have symptoms of a stroke. These may include: 
¨ Sudden numbness, tingling, weakness, or loss of movement in your face, arm, or leg, especially on only one side of your body. ¨ Sudden vision changes. ¨ Sudden trouble speaking. ¨ Sudden confusion or trouble understanding simple statements. ¨ Sudden problems with walking or balance. ¨ A sudden, severe headache that is different from past headaches. · You have severe back or belly pain. Do not wait until your blood pressure comes down on its own. Get help right away. Call your doctor now or seek immediate care if: 
· Your blood pressure is much higher than normal (such as 180/110 or higher), but you don't have symptoms. · You think high blood pressure is causing symptoms, such as: ¨ Severe headache. ¨ Blurry vision. Watch closely for changes in your health, and be sure to contact your doctor if: · Your blood pressure measures 140/90 or higher at least 2 times. That means the top number is 140 or higher or the bottom number is 90 or higher, or both. · You think you may be having side effects from your blood pressure medicine. · Your blood pressure is usually normal, but it goes above normal at least 2 times. Where can you learn more? Go to http://pepe-lolita.info/. Enter D027 in the search box to learn more about \"High Blood Pressure: Care Instructions. \" Current as of: August 8, 2016 Content Version: 11.3 © 6263-6283 [employer]. Care instructions adapted under license by [employer] (which disclaims liability or warranty for this information). If you have questions about a medical condition or this instruction, always ask your healthcare professional. [address] any warranty or liability for your use of this information. Introducing [facility]! Gilberto Fernandez introduces [employer] patient portal. Now you can access parts of your medical record, email your doctor's office, and request medication refills online. 1. In your internet browser, go to [email or web address] 2. Click on the First Time User? Click Here link in the Sign In box. You will see the New Member Sign Up page. 3. Enter your [employer] Access Code exactly as it appears below. You will not need to use this code after youve completed the sign-up process. If you do not sign up before the expiration date, you must request a new code. · [employer] Access Code: [email or web address] Expires: 10/12/2017 10:16 AM 
 
4. Enter the last four digits of your Social Security Number (xxxx) and Date of Birth (mm/dd/yyyy) as indicated and click Submit. You will be taken to the next sign-up page. 5. Create a Kuotus ID. This will be your [employer] login ID and cannot be changed, so think of one that is secure and easy to remember. 6. Create a [employer] password. You can change your password at any time. 7. Enter your Password Reset Question and Answer. This can be used at a later time if you forget your password. 8. Enter your e-mail address. You will receive e-mail notification when new information is available in [address] 9. Click Sign Up. You can now view and download portions of your medical record. 10. Click the Download Summary menu link to download a portable copy of your medical information. If you have questions, please visit the Frequently Asked Questions section of the [employer] website. Remember, [employer] is NOT to be used for urgent needs. For medical emergencies, dial 911. Now available from your iPhone and Android! Please provide this summary of care documentation to your next provider. Your primary care clinician is listed as Sally 51. If you have any questions after today's visit, please call [phone].

## 2017-08-31 ENCOUNTER — OFFICE VISIT (OUTPATIENT)
Dept: FAMILY MEDICINE CLINIC | Age: 57
End: 2017-08-31

## 2017-08-31 VITALS
DIASTOLIC BLOOD PRESSURE: 90 MMHG | HEIGHT: 63 IN | SYSTOLIC BLOOD PRESSURE: 138 MMHG | OXYGEN SATURATION: 97 % | BODY MASS INDEX: 43.23 KG/M2 | WEIGHT: 244 LBS | HEART RATE: 65 BPM | RESPIRATION RATE: 16 BRPM | TEMPERATURE: 98.1 F

## 2017-08-31 DIAGNOSIS — Z00.00 ANNUAL PHYSICAL EXAM: Primary | ICD-10-CM

## 2017-08-31 DIAGNOSIS — J45.40 MODERATE PERSISTENT ASTHMA WITHOUT COMPLICATION: ICD-10-CM

## 2017-08-31 DIAGNOSIS — Z12.11 COLON CANCER SCREENING: ICD-10-CM

## 2017-08-31 DIAGNOSIS — Z12.39 BREAST CANCER SCREENING: ICD-10-CM

## 2017-08-31 DIAGNOSIS — I10 ESSENTIAL HYPERTENSION: ICD-10-CM

## 2017-08-31 DIAGNOSIS — Z12.4 PAP SMEAR FOR CERVICAL CANCER SCREENING: ICD-10-CM

## 2017-08-31 DIAGNOSIS — Z78.0 POSTMENOPAUSAL: ICD-10-CM

## 2017-08-31 DIAGNOSIS — Z23 ENCOUNTER FOR IMMUNIZATION: ICD-10-CM

## 2017-08-31 NOTE — PROGRESS NOTES
Assessment/Plan:    *Diagnoses and all orders for this visit:    1. Annual physical exam    2. Essential hypertension    3. Breast cancer screening  -     [facility] MAMMO BI SCREENING INCL CAD; Future    4. Encounter for immunization  -     [place] IMMUNIZ ADMIN,1 SINGLE/COMB VAC/TOXOID  -     Pneumococcal polysaccharide vaccine, 23-valent, adult or immunosuppressed pt dose    5. Colon cancer screening  -     REFERRAL TO GASTROENTEROLOGY    6. Postmenopausal  -     DEXA BONE DENSITY STUDY AXIAL; Future    7. Pap smear for cervical cancer screening  -     REFERRAL TO OBSTETRICS AND GYNECOLOGY    8. Moderate persistent asthma without complication        Pt will be moving to [facility] soon. Advised pt to take Zyrtec daily and try Tylenol sinus for the sinus pressure. If things progress to her getting sick, she will call and I will send in an Abx. The plan was discussed with the patient. The patient verbalized understanding and is in agreement with the plan. All medication potential side effects were discussed with the patient.    -------------------------------------------------------------------------------------------------------------------        Gino Tovar is a 65 y.o. female and presents with Hypertension and Sinus Pain         Subjective:  Pt here for annual check up. HTN: well controlled. Asthma: stable. Having some sinus congestion, mild sore throat from drainage. Has issues with allergies. ROS:  Constitutional: No recent weight change. No weakness/fatigue. No f/c. Skin: No rashes, change in nails/hair, itching   HENT: No HA, dizziness. No hearing loss/tinnitus. No nasal congestion/discharge. Eyes: No change in vision, double/blurred vision or eye pain/redness. Cardiovascular: No CP/palpitations. No HSU/orthopnea/PND. Respiratory: No cough/sputum, dyspnea, wheezing. Gastointestinal: No dysphagia, reflux. No n/v. No constipation/diarrhea. No melena/rectal bleeding. Genitourinary: No dysuria, urinary hesitancy, nocturia, hematuria. No incontinence. Musculoskeletal: No joint pain/stiffness. No muscle pain/tenderness. Endo: No heat/cold intolerance, no polyuria/polydypsia. Heme: No h/o anemia. No easy bleeding/bruising. Allergy/Immunology: No seasonal rhinitis. Denies frequent colds, sinus/ear infections. Neurological: No seizures/numbness/weakness. No paresthesias. Psychiatric:  No depression, anxiety. The problem list was updated as a part of today's visit. Patient Active Problem List   Diagnosis Code    Asthma J45.909    HTN (hypertension) I10    Irregular menstrual cycle N92.6    Anemia D64.9    Anxiety F41.9    Degenerative arthritis of left knee M17.12    Noncompliance with medication regimen Z91.14       The PSH, FH were reviewed. SH:  Social History   Substance Use Topics    Smoking status: Never Smoker    Smokeless tobacco: Never Used    Alcohol use No       Medications/Allergies:  Current Outpatient Prescriptions on File Prior to Visit   Medication Sig Dispense Refill    fluticasone-salmeterol (ADVAIR DISKUS) 250-50 mcg/dose diskus inhaler Take 1 Puff by inhalation every twelve (12) hours. 3 Inhaler 2    azelastine (ASTELIN) 137 mcg (0.1 %) nasal spray 1 [place] by Both Nostrils route two (2) times a day. Use in each nostril as directed 1 Bottle 1    lisinopril-hydroCHLOROthiazide (PRINZIDE, ZESTORETIC) 20-12.5 mg per tablet Take 2 Tabs by mouth daily. 180 Tab 1    ibuprofen (MOTRIN) 800 mg tablet Take 1 Tab by mouth every eight (8) hours as needed for Pain. 90 Tab 1    montelukast (SINGULAIR) 10 mg tablet Take 1 Tab by mouth daily. 90 Tab 1    amLODIPine (NORVASC) 5 mg tablet Take 1 Tab by mouth daily. 90 Tab 1    albuterol (PROVENTIL HFA, VENTOLIN HFA, PROAIR HFA) 90 mcg/actuation inhaler Take 2 Puffs by inhalation every six (6) hours as needed for Wheezing.  2 Inhaler 1     No current facility-administered medications on file prior to visit. Allergies   Allergen Reactions    Pcn [Penicillins] Rash         Health Maintenance:   Health Maintenance   Topic Date Due    COLONOSCOPY  07/09/1979    Pneumococcal 19-64 Medium Risk (1 of 1 - PPSV23) 07/09/1980    PAP AKA CERVICAL CYTOLOGY  07/09/1982    BREAST CANCER SCRN MAMMOGRAM  07/09/2011    DTaP/Tdap/Td series (2 - Td) 01/06/2024    INFLUENZA AGE 9 TO ADULT  Addressed       Objective:  Visit Vitals    /90 (BP 1 Location: Left arm, BP Patient Position: Sitting)    Pulse 65    Temp 98.1 °F (36.7 °C) (Oral)    Resp 16    Ht 5' 3\" (1.6 m)    Wt 244 lb (110.7 kg)    SpO2 97%    BMI 43.22 kg/m2          Nurses notes and VS reviewed.       Physical Examination: General appearance - alert, well appearing, and in no distress  Chest - clear to auscultation, no wheezes, rales or rhonchi, symmetric air entry  Heart - normal rate, regular rhythm, normal S1, S2, no murmurs, rubs, clicks or gallops  Abdomen - soft, nontender, nondistended, no masses or organomegaly  Musculoskeletal - no joint tenderness, deformity or swelling  Extremities - peripheral pulses normal, no pedal edema, no clubbing or cyanosis        Labwork and Ancillary Studies:    CBC w/Diff  Lab Results   Component Value Date/Time    WBC 3.9 08/04/2017 11:15 AM    HGB 13.4 08/04/2017 11:15 AM    PLATELET 837 50/64/2907 11:15 AM         Basic Metabolic Profile  Lab Results   Component Value Date/Time    Sodium 142 08/04/2017 11:15 AM    Potassium 4.2 08/04/2017 11:15 AM    Chloride 104 08/04/2017 11:15 AM    CO2 30 08/04/2017 11:15 AM    Anion gap 8 08/04/2017 11:15 AM    Glucose 102 08/04/2017 11:15 AM    BUN 9 08/04/2017 11:15 AM    Creatinine 0.64 08/04/2017 11:15 AM    BUN/Creatinine ratio 14 08/04/2017 11:15 AM    GFR est AA >60 08/04/2017 11:15 AM    GFR est non-AA >60 08/04/2017 11:15 AM    Calcium 9.0 08/04/2017 11:15 AM         LFT  Lab Results   Component Value Date/Time    ALT (SGPT) 21 08/04/2017 11:15 AM    AST (SGOT) 14 08/04/2017 11:15 AM    Alk.  phosphatase 63 08/04/2017 11:15 AM    Bilirubin, direct 0.1 08/04/2017 11:15 AM    Bilirubin, total 0.4 08/04/2017 11:15 AM         Cholesterol  Lab Results   Component Value Date/Time    Cholesterol, total 164 08/04/2017 11:15 AM    HDL Cholesterol 55 08/04/2017 11:15 AM    LDL, calculated 95.2 08/04/2017 11:15 AM    Triglyceride 69 08/04/2017 11:15 AM    CHOL/HDL Ratio 3.0 08/04/2017 11:15 AM

## 2017-08-31 NOTE — MR AVS SNAPSHOT
Visit Information Date & Time Provider Department Dept. Phone Encounter #  
 8/31/2017  7:45 AM Enedina Quezada, 3 [facility] [phone] [id number] Your Appointments 9/18/2017  7:45 AM  
Follow Up with Enedina Quezada MD  
3 [facility] [address] [place] [address]) Appt Note: 1 month f/u  
 [address] [facility] [address] [facility] [address] [phone]  
  
   
 [address] Upcoming Health Maintenance Date Due COLONOSCOPY 7/9/1979 Pneumococcal 19-64 Medium Risk (1 of 1 - PPSV23) 7/9/1980 PAP AKA CERVICAL CYTOLOGY 7/9/1982 BREAST CANCER SCRN MAMMOGRAM 7/9/2011 DTaP/Tdap/Td series (2 - Td) 1/6/2024 Allergies as of 8/31/2017  Review Complete On: 8/31/2017 By: Enedina Quezada MD  
  
 Severity Noted Reaction Type Reactions Pcn [Penicillins]  07/19/2010    Rash Current Immunizations  Reviewed on 8/31/2017 Name Date Influenza Vaccine (Quad) PF  Incomplete Tdap 1/6/2014 Reviewed by Enedina Quezada MD on 8/31/2017 at  8:37 AM  
You Were Diagnosed With   
  
 Codes Comments Annual physical exam    -  Primary ICD-10-CM: Z00.00 ICD-9-CM: V70.0 Essential hypertension     ICD-10-CM: I10 
ICD-9-CM: 401.9 Breast cancer screening     ICD-10-CM: Z12.39 
ICD-9-CM: V76.10 Encounter for immunization     ICD-10-CM: A34 ICD-9-CM: V03.89 Colon cancer screening     ICD-10-CM: Z12.11 ICD-9-CM: V76.51 Postmenopausal     ICD-10-CM: Z78.0 ICD-9-CM: V49.81 Pap smear for cervical cancer screening     ICD-10-CM: Z12.4 ICD-9-CM: V76.2 Moderate persistent asthma without complication     LKA-34-QS: J45.40 ICD-9-CM: 493.90 Vitals BP Pulse Temp Resp Height(growth percentile) Weight(growth percentile) 138/90 (BP 1 Location: Left arm, BP Patient Position: Sitting) 65 98.1 °F (36.7 °C) (Oral) 16 5' 3\" (1.6 m) 244 lb (110.7 kg) SpO2 BMI OB Status Smoking Status 97% 43.22 kg/m2 Menopause Never Smoker Vitals History BMI and BSA Data Body Mass Index Body Surface Area  
 43.22 kg/m 2 2.22 m 2 Preferred Pharmacy Pharmacy Name Phone [address], [place] [phone] Your Updated Medication List  
  
   
This list is accurate as of: 8/31/17  9:00 AM.  Always use your most recent med list.  
  
  
  
  
 albuterol 90 mcg/actuation inhaler Commonly known as:  PROVENTIL HFA, VENTOLIN HFA, PROAIR HFA Take 2 Puffs by inhalation every six (6) hours as needed for Wheezing. amLODIPine 5 mg tablet Commonly known as:  Job Dub Take 1 Tab by mouth daily. azelastine 137 mcg (0.1 %) nasal spray Commonly known as:  ASTELIN  
1 Spray by Both Nostrils route two (2) times a day. Use in each nostril as directed  
  
 fluticasone-salmeterol 250-50 mcg/dose diskus inhaler Commonly known as:  ADVAIR DISKUS Take 1 Puff by inhalation every twelve (12) hours. ibuprofen 800 mg tablet Commonly known as:  MOTRIN Take 1 Tab by mouth every eight (8) hours as needed for Pain. lisinopril-hydroCHLOROthiazide 20-12.5 mg per tablet Commonly known as:  Arturo Rom Take 2 Tabs by mouth daily. montelukast 10 mg tablet Commonly known as:  SINGULAIR Take 1 Tab by mouth daily. We Performed the Following INFLUENZA VIRUS VAC QUAD,SPLIT,PRESV FREE SYRINGE 3/> YRS IM [id number] CPT(R)] [place] IMMUNIZ ADMIN,1 SINGLE/COMB VAC/TOXOID [id number] CPT(R)] REFERRAL TO GASTROENTEROLOGY [WVS02 Custom] Comments:  
 Pt needs baseline colonoscopy. REFERRAL TO OBSTETRICS AND GYNECOLOGY [REF51 Custom] To-Do List   
 08/31/2017 Imaging:  DEXA BONE DENSITY STUDY AXIAL   
  
 08/31/2017 Imaging:  CHAN MAMMO BI SCREENING INCL CAD Referral Information Referral ID Referred By Referred To [id number] [facility] G[facility]enterology [facility]   
   [address]  [address] [place], [address] [facility] Phone: [phone] Fax: [phone] Visits Status Start Date End Date 1 New Request 8/31/17 8/31/18 If your referral has a status of pending review or denied, additional information will be sent to support the outcome of this decision. Referral ID Referred By Referred To  
 [id number] Nava Harrington DO  
   [address] [facility] [address] [place], [address] Phone: [phone] Fax: [phone] Visits Status Start Date End Date 1 New Request 8/31/17 8/31/18 If your referral has a status of pending review or denied, additional information will be sent to support the outcome of this decision. Patient Instructions Well Visit, Women 48 to 72: Care Instructions Your Care Instructions Physical exams can help you stay healthy. Your doctor has checked your overall health and may have suggested ways to take good care of yourself. He or she also may have recommended tests. At home, you can help prevent illness with healthy eating, regular exercise, and other steps. Follow-up care is a key part of your treatment and safety. Be sure to make and go to all appointments, and call your doctor if you are having problems. It's also a good idea to know your test results and keep a list of the medicines you take. How can you care for yourself at home? · Reach and stay at a healthy weight. This will lower your risk for many problems, such as obesity, diabetes, heart disease, and high blood pressure. · Get at least 30 minutes of exercise on most days of the week. Walking is a good choice. You also may want to do other activities, such as running, swimming, cycling, or playing tennis or team sports. · Do not smoke. Smoking can make health problems worse.  If you need help quitting, talk to your doctor about stop-smoking programs and medicines. These can increase your chances of quitting for good. · Protect your skin from too much sun. When you're outdoors from 10 a.m. to 4 p.m., stay in the shade or cover up with clothing and a hat with a wide brim. Wear sunglasses that block UV rays. Even when it's cloudy, put broad-spectrum sunscreen (SPF 30 or higher) on any exposed skin. · See a dentist one or two times a year for checkups and to have your teeth cleaned. · Wear a seat belt in the car. · Limit alcohol to 1 drink a day. Too much alcohol can cause health problems. Follow your doctor's advice about when to have certain tests. These tests can spot problems early. · Cholesterol. Your doctor will tell you how often to have this done based on your age, family history, or other things that can increase your risk for heart attack and stroke. · Blood pressure. Have your blood pressure checked during a routine doctor visit. Your doctor will tell you how often to check your blood pressure based on your age, your blood pressure results, and other factors. · Mammogram. Ask your doctor how often you should have a mammogram, which is an X-ray of your breasts. A mammogram can spot breast cancer before it can be felt and when it is easiest to treat. · Pap test and pelvic exam. Ask your doctor how often you should have a Pap test. You may not need to have a Pap test as often as you used to. · Vision. Have your eyes checked every year or two or as often as your doctor suggests. Some experts recommend that you have yearly exams for glaucoma and other age-related eye problems starting at age 48. · Hearing. Tell your doctor if you notice any change in your hearing. You can have tests to find out how well you hear. · Diabetes. Ask your doctor whether you should have tests for diabetes. · Colon cancer. You should begin tests for colon cancer at age 48.  You may have one of several tests. Your doctor will tell you how often to have tests based on your age and risk. Risks include whether you already had a precancerous polyp removed from your colon or whether your parents, sisters and brothers, or children have had colon cancer. · Thyroid disease. Talk to your doctor about whether to have your thyroid checked as part of a regular physical exam. Women have an increased chance of a thyroid problem. · Osteoporosis. You should begin tests for bone density at age 72. If you are younger than 72, ask your doctor whether you have factors that may increase your risk for this disease. You may want to have this test before age 72. · Heart attack and stroke risk. At least every 4 to 6 years, you should have your risk for heart attack and stroke assessed. Your doctor uses factors such as your age, blood pressure, cholesterol, and whether you smoke or have diabetes to show what your risk for a heart attack or stroke is over the next 10 years. When should you call for help? Watch closely for changes in your health, and be sure to contact your doctor if you have any problems or symptoms that concern you. Where can you learn more? Go to http://eppe-lolita.info/. Enter X670 in the search box to learn more about \"Well Visit, Women 50 to 72: Care Instructions. \" Current as of: July 19, 2016 Content Version: 11.3 © 5931-6753 Healthwise, Incorporated. Care instructions adapted under license by [employer] (which disclaims liability or warranty for this information). If you have questions about a medical condition or this instruction, always ask your healthcare professional. [place] [address] any warranty or liability for your use of this information. High Blood Pressure: Care Instructions Your Care Instructions If your blood pressure is usually above 140/90, you have high blood pressure, or hypertension. That means the top number is 140 or higher or the bottom number is 90 or higher, or both. Despite what a lot of people think, high blood pressure usually doesn't cause headaches or make you feel dizzy or lightheaded. It usually has no symptoms. But it does increase your risk for heart attack, stroke, and kidney or eye damage. The higher your blood pressure, the more your risk increases. Your doctor will give you a goal for your blood pressure. Your goal will be based on your health and your age. An example of a goal is to keep your blood pressure below 140/90. Lifestyle changes, such as eating healthy and being active, are always important to help lower blood pressure. You might also take medicine to reach your blood pressure goal. 
Follow-up care is a key part of your treatment and safety. Be sure to make and go to all appointments, and call your doctor if you are having problems. It's also a good idea to know your test results and keep a list of the medicines you take. How can you care for yourself at home? Medical treatment · If you stop taking your medicine, your blood pressure will go back up. You may take one or more types of medicine to lower your blood pressure. Be safe with medicines. Take your medicine exactly as prescribed. Call your doctor if you think you are having a problem with your medicine. · Talk to your doctor before you start taking aspirin every day. Aspirin can help certain people lower their risk of a heart attack or stroke. But taking aspirin isn't right for everyone, because it can cause serious bleeding. · See your doctor regularly. You may need to see the doctor more often at first or until your blood pressure comes down. · If you are taking blood pressure medicine, talk to your doctor before you take decongestants or anti-inflammatory medicine, such as ibuprofen. Some of these medicines can raise blood pressure. · Learn how to check your blood pressure at home. Lifestyle changes · Stay at a healthy weight. This is especially important if you put on weight around the waist. Losing even 10 pounds can help you lower your blood pressure. · If your doctor recommends it, get more exercise. Walking is a good choice. Bit by bit, increase the amount you walk every day. Try for at least 30 minutes on most days of the week. You also may want to swim, bike, or do other activities. · Avoid or limit alcohol. Talk to your doctor about whether you can drink any alcohol. · Try to limit how much sodium you eat to less than 2,300 milligrams (mg) a day. Your doctor may ask you to try to eat less than 1,500 mg a day. · Eat plenty of fruits (such as bananas and oranges), vegetables, legumes, whole grains, and low-fat dairy products. · Lower the amount of saturated fat in your diet. Saturated fat is found in animal products such as milk, cheese, and meat. Limiting these foods may help you lose weight and also lower your risk for heart disease. · Do not smoke. Smoking increases your risk for heart attack and stroke. If you need help quitting, talk to your doctor about stop-smoking programs and medicines. These can increase your chances of quitting for good. When should you call for help? Call 911 anytime you think you may need emergency care. This may mean having symptoms that suggest that your blood pressure is causing a serious heart or blood vessel problem. Your blood pressure may be over 180/110. For example, call 911 if: 
· You have symptoms of a heart attack. These may include: ¨ Chest pain or pressure, or a strange feeling in the chest. 
¨ Sweating. ¨ Shortness of breath. ¨ Nausea or vomiting. ¨ Pain, pressure, or a strange feeling in the back, neck, jaw, or upper belly or in one or both shoulders or arms. ¨ Lightheadedness or sudden weakness. ¨ A fast or irregular heartbeat. · You have symptoms of a stroke. These may include: 
¨ Sudden numbness, tingling, weakness, or loss of movement in your face, arm, or leg, especially on only one side of your body. ¨ Sudden vision changes. ¨ Sudden trouble speaking. ¨ Sudden confusion or trouble understanding simple statements. ¨ Sudden problems with walking or balance. ¨ A sudden, severe headache that is different from past headaches. · You have severe back or belly pain. Do not wait until your blood pressure comes down on its own. Get help right away. Call your doctor now or seek immediate care if: 
· Your blood pressure is much higher than normal (such as 180/110 or higher), but you don't have symptoms. · You think high blood pressure is causing symptoms, such as: ¨ Severe headache. ¨ Blurry vision. Watch closely for changes in your health, and be sure to contact your doctor if: 
· Your blood pressure measures 140/90 or higher at least 2 times. That means the top number is 140 or higher or the bottom number is 90 or higher, or both. · You think you may be having side effects from your blood pressure medicine. · Your blood pressure is usually normal, but it goes above normal at least 2 times. Where can you learn more? Go to http://pepe-lolita.info/. Enter B576 in the search box to learn more about \"High Blood Pressure: Care Instructions. \" Current as of: August 8, 2016 Content Version: 11.3 © 4013-9245 [employer]. Care instructions adapted under license by [employer] (which disclaims liability or warranty for this information). If you have questions about a medical condition or this instruction, always ask your healthcare professional. [address] any warranty or liability for your use of this information. Introducing [facility]!    
 [facility] introduces [employer] patient portal. Now you can access parts of your medical record, email your doctor's office, and request medication refills online. 1. In your internet browser, go to [email or web address] 2. Click on the First Time User? Click Here link in the Sign In box. You will see the New Member Sign Up page. 3. Enter your [facility] Access Code exactly as it appears below. You will not need to use this code after youve completed the sign-up process. If you do not sign up before the expiration date, you must request a new code. · [facility] Access Code: [email or web address] Expires: 10/12/2017 10:16 AM 
 
4. Enter the last four digits of your Social Security Number (xxxx) and Date of Birth (mm/dd/yyyy) as indicated and click Submit. You will be taken to the next sign-up page. 5. Create a [facility] ID. This will be your [facility] login ID and cannot be changed, so think of one that is secure and easy to remember. 6. Create a [facility] password. You can change your password at any time. 7. Enter your Password Reset Question and Answer. This can be used at a later time if you forget your password. 8. Enter your e-mail address. You will receive e-mail notification when new information is available in [address] 9. Click Sign Up. You can now view and download portions of your medical record. 10. Click the Download Summary menu link to download a portable copy of your medical information. If you have questions, please visit the Frequently Asked Questions section of the [facility] website. Remember, [facility] is NOT to be used for urgent needs. For medical emergencies, dial 911. Now available from your iPhone and Android! Please provide this summary of care documentation to your next provider. Your primary care clinician is listed as Sally 51. If you have any questions after today's visit, please call [phone].

## 2017-08-31 NOTE — PROGRESS NOTES
Marguerite Barron is a 65 y.o. female here today for Htn follow up sinus drainage/pain , Sore throat x 1 weeks     1. Have you been to the ER, urgent care clinic or hospitalized since your last visit? NO.     2. Have you seen or consulted any other health care providers outside of the [facility] since your last visit (Include any pap smears or colon screening)? NO      Do you have an Advanced Directive? NO    Would you like information on Advanced Directives?  NO    Learning Assessment 6/11/2015   PRIMARY LEARNER Patient   HIGHEST LEVEL OF EDUCATION - PRIMARY LEARNER  4 YEARS OF COLLEGE   BARRIERS PRIMARY LEARNER NONE   CO-LEARNER CAREGIVER No   PRIMARY LANGUAGE ENGLISH   LEARNER PREFERENCE PRIMARY LISTENING   ANSWERED BY self   RELATIONSHIP SELF

## 2017-08-31 NOTE — PATIENT INSTRUCTIONS
Well Visit, Women 48 to 72: Care Instructions  Your Care Instructions  Physical exams can help you stay healthy. Your doctor has checked your overall health and may have suggested ways to take good care of yourself. He or she also may have recommended tests. At home, you can help prevent illness with healthy eating, regular exercise, and other steps. Follow-up care is a key part of your treatment and safety. Be sure to make and go to all appointments, and call your doctor if you are having problems. It's also a good idea to know your test results and keep a list of the medicines you take. How can you care for yourself at home? · Reach and stay at a healthy weight. This will lower your risk for many problems, such as obesity, diabetes, heart disease, and high blood pressure. · Get at least 30 minutes of exercise on most days of the week. Walking is a good choice. You also may want to do other activities, such as running, swimming, cycling, or playing tennis or team sports. · Do not smoke. Smoking can make health problems worse. If you need help quitting, talk to your doctor about stop-smoking programs and medicines. These can increase your chances of quitting for good. · Protect your skin from too much sun. When you're outdoors from 10 a.m. to 4 p.m., stay in the shade or cover up with clothing and a hat with a wide brim. Wear sunglasses that block UV rays. Even when it's cloudy, put broad-spectrum sunscreen (SPF 30 or higher) on any exposed skin. · See a dentist one or two times a year for checkups and to have your teeth cleaned. · Wear a seat belt in the car. · Limit alcohol to 1 drink a day. Too much alcohol can cause health problems. Follow your doctor's advice about when to have certain tests. These tests can spot problems early. · Cholesterol.  Your doctor will tell you how often to have this done based on your age, family history, or other things that can increase your risk for heart attack and stroke. · Blood pressure. Have your blood pressure checked during a routine doctor visit. Your doctor will tell you how often to check your blood pressure based on your age, your blood pressure results, and other factors. · Mammogram. Ask your doctor how often you should have a mammogram, which is an X-ray of your breasts. A mammogram can spot breast cancer before it can be felt and when it is easiest to treat. · Pap test and pelvic exam. Ask your doctor how often you should have a Pap test. You may not need to have a Pap test as often as you used to. · Vision. Have your eyes checked every year or two or as often as your doctor suggests. Some experts recommend that you have yearly exams for glaucoma and other age-related eye problems starting at age 48. · Hearing. Tell your doctor if you notice any change in your hearing. You can have tests to find out how well you hear. · Diabetes. Ask your doctor whether you should have tests for diabetes. · Colon cancer. You should begin tests for colon cancer at age 48. You may have one of several tests. Your doctor will tell you how often to have tests based on your age and risk. Risks include whether you already had a precancerous polyp removed from your colon or whether your parents, sisters and brothers, or children have had colon cancer. · Thyroid disease. Talk to your doctor about whether to have your thyroid checked as part of a regular physical exam. Women have an increased chance of a thyroid problem. · Osteoporosis. You should begin tests for bone density at age 72. If you are younger than 72, ask your doctor whether you have factors that may increase your risk for this disease. You may want to have this test before age 72. · Heart attack and stroke risk. At least every 4 to 6 years, you should have your risk for heart attack and stroke assessed.  Your doctor uses factors such as your age, blood pressure, cholesterol, and whether you smoke or have diabetes to show what your risk for a heart attack or stroke is over the next 10 years. When should you call for help? Watch closely for changes in your health, and be sure to contact your doctor if you have any problems or symptoms that concern you. Where can you learn more? Go to http://pepe-lolita.info/. Enter H524 in the search box to learn more about \"Well Visit, Women 50 to 72: Care Instructions. \"  Current as of: July 19, 2016  Content Version: 11.3  © 3672-9057 [employer]. Care instructions adapted under license by [employer] (which disclaims liability or warranty for this information). If you have questions about a medical condition or this instruction, always ask your healthcare professional. [address] any warranty or liability for your use of this information. High Blood Pressure: Care Instructions  Your Care Instructions  If your blood pressure is usually above 140/90, you have high blood pressure, or hypertension. That means the top number is 140 or higher or the bottom number is 90 or higher, or both. Despite what a lot of people think, high blood pressure usually doesn't cause headaches or make you feel dizzy or lightheaded. It usually has no symptoms. But it does increase your risk for heart attack, stroke, and kidney or eye damage. The higher your blood pressure, the more your risk increases. Your doctor will give you a goal for your blood pressure. Your goal will be based on your health and your age. An example of a goal is to keep your blood pressure below 140/90. Lifestyle changes, such as eating healthy and being active, are always important to help lower blood pressure. You might also take medicine to reach your blood pressure goal.  Follow-up care is a key part of your treatment and safety. Be sure to make and go to all appointments, and call your doctor if you are having problems.  It's also a good idea to know your test results and keep a list of the medicines you take. How can you care for yourself at home? Medical treatment  · If you stop taking your medicine, your blood pressure will go back up. You may take one or more types of medicine to lower your blood pressure. Be safe with medicines. Take your medicine exactly as prescribed. Call your doctor if you think you are having a problem with your medicine. · Talk to your doctor before you start taking aspirin every day. Aspirin can help certain people lower their risk of a heart attack or stroke. But taking aspirin isn't right for everyone, because it can cause serious bleeding. · See your doctor regularly. You may need to see the doctor more often at first or until your blood pressure comes down. · If you are taking blood pressure medicine, talk to your doctor before you take decongestants or anti-inflammatory medicine, such as ibuprofen. Some of these medicines can raise blood pressure. · Learn how to check your blood pressure at home. Lifestyle changes  · Stay at a healthy weight. This is especially important if you put on weight around the waist. Losing even 10 pounds can help you lower your blood pressure. · If your doctor recommends it, get more exercise. Walking is a good choice. Bit by bit, increase the amount you walk every day. Try for at least 30 minutes on most days of the week. You also may want to swim, bike, or do other activities. · Avoid or limit alcohol. Talk to your doctor about whether you can drink any alcohol. · Try to limit how much sodium you eat to less than 2,300 milligrams (mg) a day. Your doctor may ask you to try to eat less than 1,500 mg a day. · Eat plenty of fruits (such as bananas and oranges), vegetables, legumes, whole grains, and low-fat dairy products. · Lower the amount of saturated fat in your diet. Saturated fat is found in animal products such as milk, cheese, and meat.  Limiting these foods may help you lose weight and also lower your risk for heart disease. · Do not smoke. Smoking increases your risk for heart attack and stroke. If you need help quitting, talk to your doctor about stop-smoking programs and medicines. These can increase your chances of quitting for good. When should you call for help? Call 911 anytime you think you may need emergency care. This may mean having symptoms that suggest that your blood pressure is causing a serious heart or blood vessel problem. Your blood pressure may be over 180/110. For example, call 911 if:  · You have symptoms of a heart attack. These may include:  ¨ Chest pain or pressure, or a strange feeling in the chest.  ¨ Sweating. ¨ Shortness of breath. ¨ Nausea or vomiting. ¨ Pain, pressure, or a strange feeling in the back, neck, jaw, or upper belly or in one or both shoulders or arms. ¨ Lightheadedness or sudden weakness. ¨ A fast or irregular heartbeat. · You have symptoms of a stroke. These may include:  ¨ Sudden numbness, tingling, weakness, or loss of movement in your face, arm, or leg, especially on only one side of your body. ¨ Sudden vision changes. ¨ Sudden trouble speaking. ¨ Sudden confusion or trouble understanding simple statements. ¨ Sudden problems with walking or balance. ¨ A sudden, severe headache that is different from past headaches. · You have severe back or belly pain. Do not wait until your blood pressure comes down on its own. Get help right away. Call your doctor now or seek immediate care if:  · Your blood pressure is much higher than normal (such as 180/110 or higher), but you don't have symptoms. · You think high blood pressure is causing symptoms, such as:  ¨ Severe headache. ¨ Blurry vision. Watch closely for changes in your health, and be sure to contact your doctor if:  · Your blood pressure measures 140/90 or higher at least 2 times. That means the top number is 140 or higher or the bottom number is 90 or higher, or both.   · You think you may be having side effects from your blood pressure medicine. · Your blood pressure is usually normal, but it goes above normal at least 2 times. Where can you learn more? Go to http://pepe-lolita.info/. Enter K536 in the search box to learn more about \"High Blood Pressure: Care Instructions. \"  Current as of: August 8, 2016  Content Version: 11.3  © 7424-6939 [employer] Care instructions adapted under license by [employer] (which disclaims liability or warranty for this information). If you have questions about a medical condition or this instruction, always ask your healthcare professional. [place] [address] any warranty or liability for your use of this information. Hpi Title: Evaluation of a Skin Lesion Additional History: Reports self piercing L ear helix age 9 yrs. Year Removed: 1900

## 2017-09-18 LAB — COLONOSCOPY, EXTERNAL: NORMAL

## 2017-10-06 ENCOUNTER — TELEPHONE (OUTPATIENT)
Dept: FAMILY MEDICINE CLINIC | Age: 57
End: 2017-10-06

## 2017-10-06 NOTE — TELEPHONE ENCOUNTER
Patient called she is trying to be seen by Dr. Josep Quick prior to her leaving the country in November. She stated that [place] out of the country [place] the next 3 years. Please call patient to schedule a F/U prior to November if able. Patient made aware that Dr. Josep Quick is out of office until 10-11-17.

## 2017-10-11 ENCOUNTER — OFFICE VISIT (OUTPATIENT)
Dept: OBGYN CLINIC | Age: 57
End: 2017-10-11

## 2017-10-11 ENCOUNTER — HOSPITAL ENCOUNTER (OUTPATIENT)
Dept: LAB | Age: 57
Discharge: HOME | End: 2017-10-11
Payer: OTHER GOVERNMENT

## 2017-10-11 VITALS
HEART RATE: 84 BPM | DIASTOLIC BLOOD PRESSURE: 84 MMHG | HEIGHT: 63 IN | BODY MASS INDEX: 44.83 KG/M2 | WEIGHT: 253 LBS | SYSTOLIC BLOOD PRESSURE: 143 MMHG

## 2017-10-11 DIAGNOSIS — N91.2 AMENORRHEA: ICD-10-CM

## 2017-10-11 DIAGNOSIS — Z01.419 ENCOUNTER FOR GYNECOLOGICAL EXAMINATION WITHOUT ABNORMAL FINDING: Primary | ICD-10-CM

## 2017-10-11 PROCEDURE — 87624 HPV HI-RISK TYP POOLED RSLT: CPT | Performed by: OBSTETRICS & GYNECOLOGY

## 2017-10-11 PROCEDURE — 88175 CYTOPATH C/V AUTO FLUID REDO: CPT | Performed by: OBSTETRICS & GYNECOLOGY

## 2017-10-11 NOTE — PROGRESS NOTES
Subjective:   65 y.o. female for Well Woman Check. LMP: January, 2017    Social History: single partner, contraception - none. Pertinent past medical hstory: hypertension, no history of DVT, CAD, DM, liver disease, migraines or smoking. Current Outpatient Prescriptions   Medication Sig Dispense Refill    fluticasone-salmeterol (ADVAIR DISKUS) 250-50 mcg/dose diskus inhaler Take 1 Puff by inhalation every twelve (12) hours. 3 Inhaler 2    azelastine (ASTELIN) 137 mcg (0.1 %) nasal spray 1 [place] by Both Nostrils route two (2) times a day. Use in each nostril as directed 1 Bottle 1    lisinopril-hydroCHLOROthiazide (PRINZIDE, ZESTORETIC) 20-12.5 mg per tablet Take 2 Tabs by mouth daily. 180 Tab 1    ibuprofen (MOTRIN) 800 mg tablet Take 1 Tab by mouth every eight (8) hours as needed for Pain. 90 Tab 1    montelukast (SINGULAIR) 10 mg tablet Take 1 Tab by mouth daily. 90 Tab 1    amLODIPine (NORVASC) 5 mg tablet Take 1 Tab by mouth daily. 90 Tab 1    albuterol (PROVENTIL HFA, VENTOLIN HFA, PROAIR HFA) 90 mcg/actuation inhaler Take 2 Puffs by inhalation every six (6) hours as needed for Wheezing. 2 Inhaler 1     Allergies   Allergen Reactions    Pcn [Penicillins] Rash     Past Medical History:   Diagnosis Date    Anemia 1/4/2011    Anxiety 12/3/2012    Asthma     Degenerative arthritis of left knee 5/28/2013    Hypothyroidism     hypo     Noncompliance with medication regimen 4/14/2017    Thyroid disease     hypo     Past Surgical History:   Procedure Laterality Date    HX GYN      c section x 3    HX ORTHOPAEDIC      REPAIR ACHILLES TENDON,PRIMARY       Family History   Problem Relation Age of Onset    Diabetes Mother      Social History   Substance Use Topics    Smoking status: Never Smoker    Smokeless tobacco: Never Used    Alcohol use No        ROS:  Feeling well. No dyspnea or chest pain on exertion. No abdominal pain, change in bowel habits, black or bloody stools.   No urinary tract symptoms. GYN ROS: no breast pain or new or enlarging lumps on self exam, no discharge or pelvic pain, she complains of a history of very irregular menses. No neurological complaints. Objective:     Visit Vitals    /84    Pulse 84    Ht 5' 3\" (1.6 m)    Wt 253 lb (114.8 kg)    BMI 44.82 kg/m2     The patient appears well, alert, oriented x 3, in no distress. ENT normal.  Neck supple. No adenopathy or thyromegaly. EDGARD. Lungs are clear, good air entry, no wheezes, rhonchi or rales. S1 and S2 normal, no murmurs, regular rate and rhythm. Abdomen soft without tenderness, guarding, mass or organomegaly. Extremities show no edema, normal peripheral pulses. Neurological is normal, no focal findings. BREAST EXAM: breasts appear normal, no suspicious masses, no skin or nipple changes or axillary nodes    PELVIC EXAM: normal external genitalia, vulva, vagina, cervix, uterus and adnexa, exam limited by obesity, PAP: Pap smear done today, HPV test    Assessment/Plan:   well woman  Uncertain menopausal status, FSH pending. Will contact the patient with results. mammogram  pap smear  return annually or prn  Plan of care discussed. Patient expressed understanding.

## 2017-10-13 LAB
FSH SERPL-ACNC: 24.9 MIU/ML
LH SERPL-ACNC: 10.5 MIU/ML

## 2017-10-16 ENCOUNTER — HOSPITAL ENCOUNTER (OUTPATIENT)
Dept: GENERAL RADIOLOGY | Age: 57
Discharge: HOME | End: 2017-10-16
Attending: INTERNAL MEDICINE
Payer: OTHER GOVERNMENT

## 2017-10-16 ENCOUNTER — HOSPITAL ENCOUNTER (OUTPATIENT)
Dept: MAMMOGRAPHY | Age: 57
Discharge: HOME | End: 2017-10-16
Attending: INTERNAL MEDICINE
Payer: OTHER GOVERNMENT

## 2017-10-16 ENCOUNTER — OFFICE VISIT (OUTPATIENT)
Dept: FAMILY MEDICINE CLINIC | Age: 57
End: 2017-10-16

## 2017-10-16 VITALS
WEIGHT: 254 LBS | HEIGHT: 63 IN | TEMPERATURE: 98.2 F | HEART RATE: 82 BPM | BODY MASS INDEX: 45 KG/M2 | OXYGEN SATURATION: 96 % | DIASTOLIC BLOOD PRESSURE: 78 MMHG | SYSTOLIC BLOOD PRESSURE: 116 MMHG | RESPIRATION RATE: 19 BRPM

## 2017-10-16 DIAGNOSIS — Z12.39 BREAST CANCER SCREENING: ICD-10-CM

## 2017-10-16 DIAGNOSIS — I10 ESSENTIAL HYPERTENSION: Primary | ICD-10-CM

## 2017-10-16 DIAGNOSIS — Z13.820 ENCOUNTER FOR SCREENING FOR OSTEOPOROSIS: ICD-10-CM

## 2017-10-16 DIAGNOSIS — Z78.0 POSTMENOPAUSAL: ICD-10-CM

## 2017-10-16 PROCEDURE — 77063 BREAST TOMOSYNTHESIS BI: CPT

## 2017-10-16 PROCEDURE — 77080 DXA BONE DENSITY AXIAL: CPT

## 2017-10-16 RX ORDER — CETIRIZINE HCL 10 MG
TABLET ORAL
COMMUNITY
End: 2019-02-28 | Stop reason: SDUPTHER

## 2017-10-16 NOTE — PROGRESS NOTES
Barrington Doyle is a 65 y.o. female (: 1960) presenting to address:    No chief complaint on file. Vitals:    10/16/17 0740   BP: 116/78   Pulse: 82   Resp: 19   Temp: 98.2 °F (36.8 °C)   TempSrc: Oral   SpO2: 96%   Weight: 254 lb (115.2 kg)   Height: 5' 2.67\" (1.592 m)   PainSc:   0 - No pain       Hearing/Vision:   No exam data present    Learning Assessment:     Learning Assessment 2015   PRIMARY LEARNER Patient   HIGHEST LEVEL OF EDUCATION - PRIMARY LEARNER  4 YEARS OF COLLEGE   BARRIERS PRIMARY LEARNER NONE   CO-LEARNER CAREGIVER No   PRIMARY LANGUAGE ENGLISH   LEARNER PREFERENCE PRIMARY LISTENING   ANSWERED BY self   RELATIONSHIP SELF     Depression Screening:     PHQ over the last two weeks 2017   Little interest or pleasure in doing things Not at all   Feeling down, depressed or hopeless Not at all   Total Score PHQ 2 0     Fall Risk Assessment:   No flowsheet data found. Abuse Screening:     Abuse Screening Questionnaire 2014   Do you ever feel afraid of your partner? N   Are you in a relationship with someone who physically or mentally threatens you? N   Is it safe for you to go home? Y     Coordination of Care Questionaire:   1. Have you been to the ER, urgent care clinic since your last visit? Hospitalized since your last visit? No     2. Have you seen or consulted any other health care providers outside of the [address] [place] [address] since your last visit? Include any pap smears or colon screening. Yes, colonoscopy Depaul 2017   And mammogram, pap smear bone density     Advanced Directive:   1. Do you have an Advanced Directive? No     2. Would you like information on Advanced Directives?  Yes     Health Maintenance Due   Topic Date Due    COLONOSCOPY  1979    BREAST CANCER SCRN MAMMOGRAM  2011

## 2017-10-16 NOTE — PROGRESS NOTES
Assessment/Plan:    *Diagnoses and all orders for this visit:    1. Essential hypertension      Will continue medication regimen. Will return to work on Nov 1, 2017, no restrictions. The plan was discussed with the patient. The patient verbalized understanding and is in agreement with the plan. All medication potential side effects were discussed with the patient.    -------------------------------------------------------------------------------------------------------------------        Jose Drake is a 65 y.o. female and presents with Follow-up         Subjective:  Pt here for f/u and for completion of further assessment for disability benefits. She was seen on 7/20/71 to complete [facility] paperwork. She remained off work since that time due to uncontrolled hypertension. She has been off since August 1, 2017. She has seen me on 8/4/17 and 8/31/17, since that time. She currently takes lisinopril / HCTZ 20/12.5 mg 2 tabs daily and Norvasc 5 mg daily. She has always had a difficult time controlling her BP while on medications because stress from her job made that difficult for her. During this time that she has been off from work, her BP has been well controlled. Today as well as the last 2 visits (142/90, 138/90 and 116/78) have been much better. She was first started on medication in July 2010, when she first became a patient with us. Even at that time, she stated to me that her job was very stressful. Her functional ability is not impaired. As of now, she is to return to work on Nov 1, 2017. The treatment plan will remain the same. ROS:  Constitutional: No recent weight change. No weakness/fatigue. No f/c. Skin: No rashes, change in nails/hair, itching   HENT: No HA, dizziness. No hearing loss/tinnitus. No nasal congestion/discharge. Eyes: No change in vision, double/blurred vision or eye pain/redness. Cardiovascular: No CP/palpitations. No HSU/orthopnea/PND.    Respiratory: No cough/sputum, dyspnea, wheezing. Gastointestinal: No dysphagia, reflux. No n/v. No constipation/diarrhea. No melena/rectal bleeding. Genitourinary: No dysuria, urinary hesitancy, nocturia, hematuria. No incontinence. Musculoskeletal: No joint pain/stiffness. No muscle pain/tenderness. Endo: No heat/cold intolerance, no polyuria/polydypsia. Heme: No h/o anemia. No easy bleeding/bruising. Allergy/Immunology: No seasonal rhinitis. Denies frequent colds, sinus/ear infections. Neurological: No seizures/numbness/weakness. No paresthesias. Psychiatric:  No depression, anxiety. The problem list was updated as a part of today's visit. Patient Active Problem List   Diagnosis Code    Asthma J45.909    HTN (hypertension) I10    Irregular menstrual cycle N92.6    Anemia D64.9    Anxiety F41.9    Degenerative arthritis of left knee M17.12    Noncompliance with medication regimen Z91.14       The PSH, FH were reviewed. SH:  Social History   Substance Use Topics    Smoking status: Never Smoker    Smokeless tobacco: Never Used    Alcohol use No       Medications/Allergies:  Current Outpatient Prescriptions on File Prior to Visit   Medication Sig Dispense Refill    azelastine (ASTELIN) 137 mcg (0.1 %) nasal spray 1 [place] by Both Nostrils route two (2) times a day. Use in each nostril as directed 1 Bottle 1    lisinopril-hydroCHLOROthiazide (PRINZIDE, ZESTORETIC) 20-12.5 mg per tablet Take 2 Tabs by mouth daily. 180 Tab 1    ibuprofen (MOTRIN) 800 mg tablet Take 1 Tab by mouth every eight (8) hours as needed for Pain. 90 Tab 1    montelukast (SINGULAIR) 10 mg tablet Take 1 Tab by mouth daily. 90 Tab 1    amLODIPine (NORVASC) 5 mg tablet Take 1 Tab by mouth daily. 90 Tab 1    albuterol (PROVENTIL HFA, VENTOLIN HFA, PROAIR HFA) 90 mcg/actuation inhaler Take 2 Puffs by inhalation every six (6) hours as needed for Wheezing.  2 Inhaler 1    fluticasone-salmeterol (ADVAIR DISKUS) 250-50 mcg/dose diskus inhaler Take 1 Puff by inhalation every twelve (12) hours. 3 Inhaler 2     No current facility-administered medications on file prior to visit. Allergies   Allergen Reactions    Pcn [Penicillins] Rash         Health Maintenance:   Health Maintenance   Topic Date Due    COLONOSCOPY  07/09/1979    BREAST CANCER SCRN MAMMOGRAM  07/09/2011    PAP AKA CERVICAL CYTOLOGY  10/11/2020    DTaP/Tdap/Td series (2 - Td) 01/06/2024    Pneumococcal 19-64 Medium Risk  Completed    INFLUENZA AGE 9 TO ADULT  Addressed       Objective:  Visit Vitals    /78 (BP 1 Location: Left leg, BP Patient Position: Sitting)    Pulse 82    Temp 98.2 °F (36.8 °C) (Oral)    Resp 19    Ht 5' 2.67\" (1.592 m)    Wt 254 lb (115.2 kg)    SpO2 96%    BMI 45.47 kg/m2          Nurses notes and VS reviewed. Physical Examination: General appearance - alert, well appearing, and in no distress        Labwork and Ancillary Studies:    CBC w/Diff  Lab Results   Component Value Date/Time    WBC 3.9 08/04/2017 11:15 AM    HGB 13.4 08/04/2017 11:15 AM    PLATELET 008 37/[birth year]/1935 11:15 AM         Basic Metabolic Profile  Lab Results   Component Value Date/Time    Sodium 142 08/04/2017 11:15 AM    Potassium 4.2 08/04/2017 11:15 AM    Chloride 104 08/04/2017 11:15 AM    CO2 30 08/04/2017 11:15 AM    Anion gap 8 08/04/2017 11:15 AM    Glucose 102 08/04/2017 11:15 AM    BUN 9 08/04/2017 11:15 AM    Creatinine 0.64 08/04/2017 11:15 AM    BUN/Creatinine ratio 14 08/04/2017 11:15 AM    GFR est AA >60 08/04/2017 11:15 AM    GFR est non-AA >60 08/04/2017 11:15 AM    Calcium 9.0 08/04/2017 11:15 AM         LFT  Lab Results   Component Value Date/Time    ALT (SGPT) 21 08/04/2017 11:15 AM    AST (SGOT) 14 08/04/2017 11:15 AM    Alk.  phosphatase 63 08/04/2017 11:15 AM    Bilirubin, direct 0.1 08/04/2017 11:15 AM    Bilirubin, total 0.4 08/04/2017 11:15 AM         Cholesterol  Lab Results   Component Value Date/Time    Cholesterol, total 164 08/04/2017 11:15 AM    HDL Cholesterol 55 08/04/2017 11:15 AM    LDL, calculated 95.2 08/04/2017 11:15 AM    Triglyceride 69 08/04/2017 11:15 AM    CHOL/HDL Ratio 3.0 08/04/2017 11:15 AM

## 2017-11-28 ENCOUNTER — TELEPHONE (OUTPATIENT)
Dept: FAMILY MEDICINE CLINIC | Age: 57
End: 2017-11-28

## 2017-11-28 DIAGNOSIS — I10 ESSENTIAL HYPERTENSION: ICD-10-CM

## 2017-11-28 DIAGNOSIS — Z91.09 ENVIRONMENTAL ALLERGIES: ICD-10-CM

## 2017-11-28 RX ORDER — AMLODIPINE BESYLATE 5 MG/1
5 TABLET ORAL DAILY
Qty: 90 TAB | Refills: 1 | Status: SHIPPED | OUTPATIENT
Start: 2017-11-28 | End: 2019-02-28 | Stop reason: SDUPTHER

## 2017-11-28 RX ORDER — MONTELUKAST SODIUM 10 MG/1
10 TABLET ORAL DAILY
Qty: 90 TAB | Refills: 1 | Status: SHIPPED | OUTPATIENT
Start: 2017-11-28 | End: 2019-02-28 | Stop reason: SDUPTHER

## 2017-11-28 RX ORDER — LISINOPRIL AND HYDROCHLOROTHIAZIDE 12.5; 2 MG/1; MG/1
2 TABLET ORAL DAILY
Qty: 180 TAB | Refills: 1 | Status: SHIPPED | OUTPATIENT
Start: 2017-11-28

## 2017-11-29 ENCOUNTER — OFFICE VISIT (OUTPATIENT)
Dept: FAMILY MEDICINE CLINIC | Age: 57
End: 2017-11-29

## 2017-11-29 VITALS
RESPIRATION RATE: 20 BRPM | DIASTOLIC BLOOD PRESSURE: 96 MMHG | OXYGEN SATURATION: 97 % | HEIGHT: 63 IN | TEMPERATURE: 98.3 F | SYSTOLIC BLOOD PRESSURE: 142 MMHG | HEART RATE: 90 BPM | BODY MASS INDEX: 45.64 KG/M2 | WEIGHT: 257.6 LBS

## 2017-11-29 DIAGNOSIS — R05.9 COUGH: ICD-10-CM

## 2017-11-29 DIAGNOSIS — J01.00 ACUTE NON-RECURRENT MAXILLARY SINUSITIS: Primary | ICD-10-CM

## 2017-11-29 RX ORDER — AZITHROMYCIN 500 MG/1
500 TABLET, FILM COATED ORAL DAILY
Qty: 7 TAB | Refills: 0 | Status: SHIPPED | OUTPATIENT
Start: 2017-11-29 | End: 2017-12-06

## 2017-11-29 RX ORDER — BENZONATATE 200 MG/1
200 CAPSULE ORAL
Qty: 30 CAP | Refills: 0 | Status: SHIPPED | OUTPATIENT
Start: 2017-11-29 | End: 2019-05-31

## 2017-11-29 NOTE — PROGRESS NOTES
Assessment/Plan:    *Diagnoses and all orders for this visit:    1. Acute non-recurrent maxillary sinusitis  -     azithromycin (ZITHROMAX) 500 mg tab; Take 1 Tab by mouth daily for 7 days. 2. Cough  -     benzonatate (TESSALON) 200 mg capsule; Take 1 Cap by mouth three (3) times daily as needed for Cough. Will return if not better. The plan was discussed with the patient. The patient verbalized understanding and is in agreement with the plan. All medication potential side effects were discussed with the patient.    -------------------------------------------------------------------------------------------------------------------        Asa Brizuela is a 65 y.o. female and presents with Headache and Cough         Subjective:  Pt here for sinus pressure, congestion, headaches, all reminiscent of when she is getting a sinus infection. Has remained afebrile, + dry cough, no chest congestion. ROS:  Constitutional: No recent weight change. No weakness/fatigue. No f/c. Skin: No rashes, change in nails/hair, itching   HENT: No HA, dizziness. No hearing loss/tinnitus. No nasal congestion/discharge. Eyes: No change in vision, double/blurred vision or eye pain/redness. Cardiovascular: No CP/palpitations. No HSU/orthopnea/PND. Respiratory: No cough/sputum, dyspnea, wheezing. Gastointestinal: No dysphagia, reflux. No n/v. No constipation/diarrhea. No melena/rectal bleeding. Genitourinary: No dysuria, urinary hesitancy, nocturia, hematuria. No incontinence. Musculoskeletal: No joint pain/stiffness. No muscle pain/tenderness. Endo: No heat/cold intolerance, no polyuria/polydypsia. Heme: No h/o anemia. No easy bleeding/bruising. Allergy/Immunology: No seasonal rhinitis. Denies frequent colds, sinus/ear infections. Neurological: No seizures/numbness/weakness. No paresthesias. Psychiatric:  No depression, anxiety.      The problem list was updated as a part of today's visit. Patient Active Problem List   Diagnosis Code    Asthma J45.909    HTN (hypertension) I10    Irregular menstrual cycle N92.6    Anemia D64.9    Anxiety F41.9    Degenerative arthritis of left knee M17.12    Noncompliance with medication regimen Z91.14       The PSH, FH were reviewed. SH:  Social History   Substance Use Topics    Smoking status: Never Smoker    Smokeless tobacco: Never Used    Alcohol use No       Medications/Allergies:  Current Outpatient Prescriptions on File Prior to Visit   Medication Sig Dispense Refill    amLODIPine (NORVASC) 5 mg tablet Take 1 Tab by mouth daily. 90 Tab 1    montelukast (SINGULAIR) 10 mg tablet Take 1 Tab by mouth daily. 90 Tab 1    lisinopril-hydroCHLOROthiazide (PRINZIDE, ZESTORETIC) 20-12.5 mg per tablet Take 2 Tabs by mouth daily. 180 Tab 1    cetirizine (ZYRTEC) 10 mg tablet Take  by mouth.  fluticasone-salmeterol (ADVAIR DISKUS) 250-50 mcg/dose diskus inhaler Take 1 Puff by inhalation every twelve (12) hours. 3 Inhaler 2    azelastine (ASTELIN) 137 mcg (0.1 %) nasal spray 1 [place] by Both Nostrils route two (2) times a day. Use in each nostril as directed 1 Bottle 1    ibuprofen (MOTRIN) 800 mg tablet Take 1 Tab by mouth every eight (8) hours as needed for Pain. 90 Tab 1    albuterol (PROVENTIL HFA, VENTOLIN HFA, PROAIR HFA) 90 mcg/actuation inhaler Take 2 Puffs by inhalation every six (6) hours as needed for Wheezing. 2 Inhaler 1     No current facility-administered medications on file prior to visit.          Allergies   Allergen Reactions    Pcn [Penicillins] Rash         Health Maintenance:   Health Maintenance   Topic Date Due    COLONOSCOPY  07/09/1979    BREAST CANCER SCRN MAMMOGRAM  10/16/2019    PAP AKA CERVICAL CYTOLOGY  10/11/2020    DTaP/Tdap/Td series (2 - Td) 01/06/2024    Pneumococcal 19-64 Medium Risk  Completed    Influenza Age 5 to Adult  Addressed       Objective:  Visit Vitals    BP (!) 142/96 (BP 1 Location: Left arm, BP Patient Position: Sitting)    Pulse 90    Temp 98.3 °F (36.8 °C) (Oral)    Resp 20    Ht 5' 2.67\" (1.592 m)    Wt 257 lb 9.6 oz (116.8 kg)    SpO2 97%    BMI 46.11 kg/m2          Nurses notes and VS reviewed. Physical Examination: General appearance - alert, well appearing, and in no distress  Ears - mild erythema in ear canals  Nose - mucosal congestion  Mouth - erythematous  Neck - supple, no significant adenopathy  Chest - clear to auscultation, no wheezes, rales or rhonchi, symmetric air entry  Heart - normal rate, regular rhythm, normal S1, S2, no murmurs, rubs, clicks or gallops        Labwork and Ancillary Studies:    CBC w/Diff  Lab Results   Component Value Date/Time    WBC 3.9 08/04/2017 11:15 AM    HGB 13.4 08/04/2017 11:15 AM    PLATELET 557 35/87/1766 11:15 AM         Basic Metabolic Profile  Lab Results   Component Value Date/Time    Sodium 142 08/04/2017 11:15 AM    Potassium 4.2 08/04/2017 11:15 AM    Chloride 104 08/04/2017 11:15 AM    CO2 30 08/04/2017 11:15 AM    Anion gap 8 08/04/2017 11:15 AM    Glucose 102 08/04/2017 11:15 AM    BUN 9 08/04/2017 11:15 AM    Creatinine 0.64 08/04/2017 11:15 AM    BUN/Creatinine ratio 14 08/04/2017 11:15 AM    GFR est AA >60 08/04/2017 11:15 AM    GFR est non-AA >60 08/04/2017 11:15 AM    Calcium 9.0 08/04/2017 11:15 AM         LFT  Lab Results   Component Value Date/Time    ALT (SGPT) 21 08/04/2017 11:15 AM    AST (SGOT) 14 08/04/2017 11:15 AM    Alk.  phosphatase 63 08/04/2017 11:15 AM    Bilirubin, direct 0.1 08/04/2017 11:15 AM    Bilirubin, total 0.4 08/04/2017 11:15 AM         Cholesterol  Lab Results   Component Value Date/Time    Cholesterol, total 164 08/04/2017 11:15 AM    HDL Cholesterol 55 08/04/2017 11:15 AM    LDL, calculated 95.2 08/04/2017 11:15 AM    Triglyceride 69 08/04/2017 11:15 AM    CHOL/HDL Ratio 3.0 08/04/2017 11:15 AM

## 2017-11-29 NOTE — MR AVS SNAPSHOT
Visit Information Date & Time Provider Department Dept. Phone Encounter #  
 11/29/2017 11:45 AM Duc Montoya, Ramandeep [facility] [phone] [id number] Upcoming Health Maintenance Date Due COLONOSCOPY 7/9/1979 BREAST CANCER SCRN MAMMOGRAM 10/16/2019 PAP AKA CERVICAL CYTOLOGY 10/11/2020 DTaP/Tdap/Td series (2 - Td) 1/6/2024 Allergies as of 11/29/2017  Review Complete On: 11/29/2017 By: Ingrid Lynn LPN Severity Noted Reaction Type Reactions Pcn [Penicillins]  07/19/2010    Rash Current Immunizations  Reviewed on 8/31/2017 Name Date Pneumococcal Polysaccharide (PPSV-23) 8/31/2017 Tdap 1/6/2014 Not reviewed this visit Vitals BP Pulse Temp Resp Height(growth percentile) Weight(growth percentile) (!) 142/96 (BP 1 Location: Left arm, BP Patient Position: Sitting) 90 98.3 °F (36.8 °C) (Oral) 20 5' 2.67\" (1.592 m) 257 lb 9.6 oz (116.8 kg) SpO2 BMI OB Status Smoking Status 97% 46.11 kg/m2 Menopause Never Smoker BMI and BSA Data Body Mass Index Body Surface Area  
 46.11 kg/m 2 2.27 m 2 Preferred Pharmacy Pharmacy Name Phone [address] [facility], [place] [phone] Your Updated Medication List  
  
   
This list is accurate as of: 11/29/17 12:17 PM.  Always use your most recent med list.  
  
  
  
  
 albuterol 90 mcg/actuation inhaler Commonly known as:  PROVENTIL HFA, VENTOLIN HFA, PROAIR HFA Take 2 Puffs by inhalation every six (6) hours as needed for Wheezing. amLODIPine 5 mg tablet Commonly known as:  Bryce Alstrom Take 1 Tab by mouth daily. azelastine 137 mcg (0.1 %) nasal spray Commonly known as:  ASTELIN  
1 Spray by Both Nostrils route two (2) times a day. Use in each nostril as directed  
  
 azithromycin 500 mg Tab Commonly known as:  Javier Kirk Take 1 Tab by mouth daily for 7 days. benzonatate 200 mg capsule Commonly known as:  TESSALON Take 1 Cap by mouth three (3) times daily as needed for Cough. fluticasone-salmeterol 250-50 mcg/dose diskus inhaler Commonly known as:  ADVAIR DISKUS Take 1 Puff by inhalation every twelve (12) hours. ibuprofen 800 mg tablet Commonly known as:  MOTRIN Take 1 Tab by mouth every eight (8) hours as needed for Pain. lisinopril-hydroCHLOROthiazide 20-12.5 mg per tablet Commonly known as:  Cooper Ornelas Take 2 Tabs by mouth daily. montelukast 10 mg tablet Commonly known as:  SINGULAIR Take 1 Tab by mouth daily. ZyrTEC 10 mg tablet Generic drug:  cetirizine Take  by mouth. Prescriptions Sent to Pharmacy Refills  
 benzonatate (TESSALON) 200 mg capsule 0 Sig: Take 1 Cap by mouth three (3) times daily as needed for Cough. Class: Normal  
 Pharmacy: [address] [facility] [address] [facility], [facility] [place] Ph #: [phone] Route: Oral  
 azithromycin (ZITHROMAX) 500 mg tab 0 Sig: Take 1 Tab by mouth daily for 7 days. Class: Normal  
 Pharmacy: [address] [facility] [address] [facility] Ph #: [phone] Route: Oral  
  
Introducing [facility] SERVICES! Pedrito Estrada introduces [employer] patient portal. Now you can access parts of your medical record, email your doctor's office, and request medication refills online. 1. In your internet browser, go to [email or web address] 2. Click on the First Time User? Click Here link in the Sign In box. You will see the New Member Sign Up page. 3. Enter your [employer] Access Code exactly as it appears below. You will not need to use this code after youve completed the sign-up process. If you do not sign up before the expiration date, you must request a new code. · [employer] Access Code: [email or web address] Expires: 1/10/2018 10:16 AM 
 
 4. Enter the last four digits of your Social Security Number (xxxx) and Date of Birth (mm/dd/yyyy) as indicated and click Submit. You will be taken to the next sign-up page. 5. Create a [facility] ID. This will be your [facility] login ID and cannot be changed, so think of one that is secure and easy to remember. 6. Create a [facility] password. You can change your password at any time. 7. Enter your Password Reset Question and Answer. This can be used at a later time if you forget your password. 8. Enter your e-mail address. You will receive e-mail notification when new information is available in [address] 9. Click Sign Up. You can now view and download portions of your medical record. 10. Click the Download Summary menu link to download a portable copy of your medical information. If you have questions, please visit the Frequently Asked Questions section of the [facility] website. Remember, [facility] is NOT to be used for urgent needs. For medical emergencies, dial 911. Now available from your iPhone and Android! Please provide this summary of care documentation to your next provider. Your primary care clinician is listed as Sally 51. If you have any questions after today's visit, please call [phone].

## 2017-11-29 NOTE — PROGRESS NOTES
Ahmet Swartz is a 65 y.o. female I here for headache and cough that she states she has had for 2 weeks. 1. Have you been to the ER, urgent care clinic since your last visit? Hospitalized since your last visit? No    2. Have you seen or consulted any other health care providers outside of the [address] [place] [address] since your last visit? Include any pap smears or colon screening. colonoscopy, pap smear, mammogram, bone density.       Health Maintenance Due   Topic Date Due    COLONOSCOPY  07/09/1979

## 2018-02-09 ENCOUNTER — TELEPHONE (OUTPATIENT)
Dept: FAMILY MEDICINE CLINIC | Age: 58
End: 2018-02-09

## 2018-02-09 NOTE — TELEPHONE ENCOUNTER
We received a letter from patient regarding her STD [employer] claim  it has been denied she spoke with zahra dhaliwal  who is handling her claimand was advised that for them to re-evaulate the decision they made dr Isreal Bazzi would have to send them confirmation that she was being follow up with in September and October for existing  condition and why it was decided that the condition required  time off to recover .  I called [facility] and spoke with customer service and was suppose to be transferred to [facility] or his voicemail instead I was sent back to the automated system so I called the / mari specialist  on paperwork Demetris Erick and request she or zahra contact me to see what in October note is missing that they need to approve her claim

## 2018-02-15 NOTE — TELEPHONE ENCOUNTER
Pratik Hendrix called and left message returning call .  I called and left him a message to call me back

## 2018-03-02 NOTE — TELEPHONE ENCOUNTER
I have not received a call back from [facility] I have faxed records again with a note asking if there is additonal information needed for patients claim

## 2018-04-06 ENCOUNTER — TELEPHONE (OUTPATIENT)
Dept: FAMILY MEDICINE CLINIC | Age: 58
End: 2018-04-06

## 2018-04-06 NOTE — TELEPHONE ENCOUNTER
Dr. Cici Cantor from [address] [place] [address] would like further information about patient's hypertension during her time off from work. It can be faxed to [phone], attention claim number [id number]. If any clarification is needed, she is requesting a a phone call.

## 2018-04-07 NOTE — TELEPHONE ENCOUNTER
Is the insurance asking for the same old info we have from previous? They need to know that the information we have is all that we have. Pt has moved to [place] so we do not have any recent notes.

## 2018-04-12 NOTE — TELEPHONE ENCOUNTER
Left message for DR Gloria Eisenberg to clarify if she didn't receive all the office notes we sent as per dr Darien Ospina message we havent seen patient since 11/2017

## 2019-02-28 ENCOUNTER — OFFICE VISIT (OUTPATIENT)
Dept: FAMILY MEDICINE CLINIC | Age: 59
End: 2019-02-28

## 2019-02-28 VITALS
DIASTOLIC BLOOD PRESSURE: 86 MMHG | SYSTOLIC BLOOD PRESSURE: 142 MMHG | BODY MASS INDEX: 49.79 KG/M2 | WEIGHT: 281 LBS | HEART RATE: 81 BPM | OXYGEN SATURATION: 98 % | HEIGHT: 63 IN | RESPIRATION RATE: 20 BRPM | TEMPERATURE: 98.9 F

## 2019-02-28 DIAGNOSIS — Z91.09 ENVIRONMENTAL ALLERGIES: ICD-10-CM

## 2019-02-28 DIAGNOSIS — J01.00 ACUTE NON-RECURRENT MAXILLARY SINUSITIS: Primary | ICD-10-CM

## 2019-02-28 DIAGNOSIS — I10 ESSENTIAL HYPERTENSION: ICD-10-CM

## 2019-02-28 PROBLEM — E66.01 OBESITY, MORBID (HCC): Status: ACTIVE | Noted: 2019-02-28

## 2019-02-28 RX ORDER — DOXYCYCLINE 100 MG/1
100 TABLET ORAL 2 TIMES DAILY
Qty: 20 TAB | Refills: 0 | Status: SHIPPED | OUTPATIENT
Start: 2019-02-28 | End: 2019-03-01 | Stop reason: CLARIF

## 2019-02-28 RX ORDER — MONTELUKAST SODIUM 10 MG/1
10 TABLET ORAL DAILY
Qty: 90 TAB | Refills: 2 | Status: SHIPPED | OUTPATIENT
Start: 2019-02-28

## 2019-02-28 RX ORDER — AMLODIPINE BESYLATE 5 MG/1
5 TABLET ORAL DAILY
Qty: 90 TAB | Refills: 1 | Status: SHIPPED | OUTPATIENT
Start: 2019-02-28

## 2019-02-28 RX ORDER — CETIRIZINE HCL 10 MG
10 TABLET ORAL DAILY
Qty: 90 TAB | Refills: 2 | Status: SHIPPED | OUTPATIENT
Start: 2019-02-28

## 2019-02-28 NOTE — PROGRESS NOTES
Gino Tovar is a 63 y.o. female (: 1960) presenting to address: Chief Complaint Patient presents with  
 Headache  
  x five days       pain scale 8/10  Pressure Behind the Eyes Vitals:  
 19 1143 BP: 142/86 Pulse: 81 Resp: 20 Temp: 98.9 °F (37.2 °C) TempSrc: Oral  
SpO2: 98% Weight: 281 lb (127.5 kg) Height: 5' 2.67\" (1.592 m) PainSc:   8 PainLoc: Head Hearing/Vision: No exam data present Learning Assessment:  
 
Learning Assessment 2015 PRIMARY LEARNER Patient HIGHEST LEVEL OF EDUCATION - PRIMARY LEARNER  4 YEARS OF COLLEGE  
BARRIERS PRIMARY LEARNER NONE  
CO-LEARNER CAREGIVER No  
PRIMARY LANGUAGE ENGLISH  
LEARNER PREFERENCE PRIMARY LISTENING  
ANSWERED BY self RELATIONSHIP SELF Depression Screening:  
 
3 most recent PHQ Screens 2019 Little interest or pleasure in doing things Not at all Feeling down, depressed, irritable, or hopeless Not at all Total Score PHQ 2 0 Fall Risk Assessment:  
No flowsheet data found. Abuse Screening:  
 
Abuse Screening Questionnaire 2014 Do you ever feel afraid of your partner? Buddy Ramírez Are you in a relationship with someone who physically or mentally threatens you? Buddy Ramírez Is it safe for you to go home? Deniz Lucero Coordination of Care Questionaire: 1. Have you been to the ER, urgent care clinic since your last visit? Hospitalized since your last visit? NO 
 
2. Have you seen or consulted any other health care providers outside of the [address] [place] [address] since your last visit? Include any pap smears or colon screening. YES Advanced Directive: 1. Do you have an Advanced Directive? NO 
 
2. Would you like information on Advanced Directives?  NO

## 2019-02-28 NOTE — PROGRESS NOTES
Assessment/Plan: *Diagnoses and all orders for this visit: 
 
1. Acute non-recurrent maxillary sinusitis 
-     doxycycline (ADOXA) 100 mg tablet; Take 1 Tab by mouth two (2) times a day for 10 days. 2. Environmental allergies 
-     montelukast (SINGULAIR) 10 mg tablet; Take 1 Tab by mouth daily. -     cetirizine (ZYRTEC) 10 mg tablet; Take 1 Tab by mouth daily. 3. Essential hypertension 
-     amLODIPine (NORVASC) 5 mg tablet; Take 1 Tab by mouth daily. Will return for routine f/u in 1 month. Will plan on labs same day. The plan was discussed with the patient. The patient verbalized understanding and is in agreement with the plan. All medication potential side effects were discussed with the patient. 
 
------------------------------------------------------------------------------------------------------------------- Velia Alejandra is a 63 y.o. female and presents with Headache (x five days       pain scale 8/10) and Pressure Behind the Eyes Subjective: 
Pt returned from her time in [place],  was posted there for work. She had a great time there. Had only an issue with a RT leg cellulitis after what was thought to be an insect bite. This has all healed, has some mild fluid retention in the RT>LT. Her sinuses have started up again, never had any sinus issues in [facility]. Has sinus headache, pressure, congestion. Nasal discharge, a mild cough from drainage. Has been using her Azelastine nasal spray but nothing else. ROS: 
Review of Systems - Negative except as above The problem list was updated as a part of today's visit. Patient Active Problem List  
Diagnosis Code  Asthma J45.909  
 HTN (hypertension) I10  
 Irregular menstrual cycle N92.6  Anemia D64.9  Anxiety F41.9  Degenerative arthritis of left knee M17.12  Noncompliance with medication regimen Z91.14  
 Obesity, morbid (HCC) E66.01 The PSH, FH were reviewed.  
 
  
 SH: 
 Social History Tobacco Use  Smoking status: Never Smoker  Smokeless tobacco: Never Used Substance Use Topics  Alcohol use: No  
 Drug use: No  
 
 
Medications/Allergies: 
Current Outpatient Medications on File Prior to Visit Medication Sig Dispense Refill  lisinopril-hydroCHLOROthiazide (PRINZIDE, ZESTORETIC) 20-12.5 mg per tablet Take 2 Tabs by mouth daily. 180 Tab 1  
 fluticasone-salmeterol (ADVAIR DISKUS) 250-50 mcg/dose diskus inhaler Take 1 Puff by inhalation every twelve (12) hours. 3 Inhaler 2  
 azelastine (ASTELIN) 137 mcg (0.1 %) nasal spray 1 [place] by Both Nostrils route two (2) times a day. Use in each nostril as directed 1 Bottle 1  
 ibuprofen (MOTRIN) 800 mg tablet Take 1 Tab by mouth every eight (8) hours as needed for Pain. 90 Tab 1  
 albuterol (PROVENTIL HFA, VENTOLIN HFA, PROAIR HFA) 90 mcg/actuation inhaler Take 2 Puffs by inhalation every six (6) hours as needed for Wheezing. 2 Inhaler 1  
 benzonatate (TESSALON) 200 mg capsule Take 1 Cap by mouth three (3) times daily as needed for Cough. 30 Cap 0 No current facility-administered medications on file prior to visit. Allergies Allergen Reactions  Pcn [Penicillins] Rash Health Maintenance:  
Health Maintenance Topic Date Due  
 COLONOSCOPY  07/09/1979  Shingrix Vaccine Age 50> (1 of 2) 07/09/2011  Influenza Age 5 to Adult  08/01/2018  BREAST CANCER SCRN MAMMOGRAM  10/16/2019  PAP AKA CERVICAL CYTOLOGY  10/11/2020  
 DTaP/Tdap/Td series (2 - Td) 01/06/2024  Pneumococcal 19-64 Medium Risk  Completed Objective: 
Visit Vitals /86 (BP 1 Location: Left arm, BP Patient Position: Sitting) Pulse 81 Temp 98.9 °F (37.2 °C) (Oral) Resp 20 Ht 5' 2.67\" (1.592 m) Wt 281 lb (127.5 kg) SpO2 98% BMI 50.30 kg/m² Nurses notes and VS reviewed. Physical Examination: General appearance - alert, well appearing, and in no distress Ears - bilateral TM's and external ear canals normal 
Nose - mucosal congestion Mouth - erythematous Neck - supple, no significant adenopathy Chest - clear to auscultation, no wheezes, rales or rhonchi, symmetric air entry Heart - normal rate, regular rhythm, normal S1, S2, no murmurs, rubs, clicks or gallops Labwork and Ancillary Studies: CBC w/Diff Lab Results Component Value Date/Time WBC 3.9 (L) 08/04/2017 11:15 AM  
 HGB 13.4 08/04/2017 11:15 AM  
 PLATELET 131 41/09/4847 11:15 AM  
  
 
 Basic Metabolic Profile Lab Results Component Value Date/Time Sodium 142 08/04/2017 11:15 AM  
 Potassium 4.2 08/04/2017 11:15 AM  
 Chloride 104 08/04/2017 11:15 AM  
 CO2 30 08/04/2017 11:15 AM  
 Anion gap 8 08/04/2017 11:15 AM  
 Glucose 102 (H) 08/04/2017 11:15 AM  
 BUN 9 08/04/2017 11:15 AM  
 Creatinine 0.64 08/04/2017 11:15 AM  
 BUN/Creatinine ratio 14 08/04/2017 11:15 AM  
 GFR est AA >60 08/04/2017 11:15 AM  
 GFR est non-AA >60 08/04/2017 11:15 AM  
 Calcium 9.0 08/04/2017 11:15 AM  
  
  
LFT Lab Results Component Value Date/Time ALT (SGPT) 21 08/04/2017 11:15 AM  
 AST (SGOT) 14 (L) 08/04/2017 11:15 AM  
 Alk. phosphatase 63 08/04/2017 11:15 AM  
 Bilirubin, direct 0.1 08/04/2017 11:15 AM  
 Bilirubin, total 0.4 08/04/2017 11:15 AM  
 
 
 
Cholesterol Lab Results Component Value Date/Time  Cholesterol, total 164 08/04/2017 11:15 AM  
 HDL Cholesterol 55 08/04/2017 11:15 AM  
 LDL, calculated 95.2 08/04/2017 11:15 AM  
 Triglyceride 69 08/04/2017 11:15 AM  
 CHOL/HDL Ratio 3.0 08/04/2017 11:15 AM

## 2019-02-28 NOTE — PATIENT INSTRUCTIONS
Sinusitis: Care Instructions Your Care Instructions Sinusitis is an infection of the lining of the sinus cavities in your head. Sinusitis often follows a cold. It causes pain and pressure in your head and face. In most cases, sinusitis gets better on its own in 1 to 2 weeks. But some mild symptoms may last for several weeks. Sometimes antibiotics are needed. Follow-up care is a key part of your treatment and safety. Be sure to make and go to all appointments, and call your doctor if you are having problems. It's also a good idea to know your test results and keep a list of the medicines you take. How can you care for yourself at home? · Take an over-the-counter pain medicine, such as acetaminophen (Tylenol), ibuprofen (Advil, Motrin), or naproxen (Aleve). Read and follow all instructions on the label. · If the doctor prescribed antibiotics, take them as directed. Do not stop taking them just because you feel better. You need to take the full course of antibiotics. · Be careful when taking over-the-counter cold or flu medicines and Tylenol at the same time. Many of these medicines have acetaminophen, which is Tylenol. Read the labels to make sure that you are not taking more than the recommended dose. Too much acetaminophen (Tylenol) can be harmful. · Breathe warm, moist air from a steamy shower, a hot bath, or a sink filled with hot water. Avoid cold, dry air. Using a humidifier in your home may help. Follow the directions for cleaning the machine. · Use saline (saltwater) nasal washes to help keep your nasal passages open and wash out mucus and bacteria. You can buy saline nose drops at a grocery store or drugstore. Or you can make your own at home by adding 1 teaspoon of salt and 1 teaspoon of baking soda to 2 cups of distilled water. If you make your own, fill a bulb syringe with the solution, insert the tip into your nostril, and squeeze gently. Mojgan Luz your nose. · Put a hot, wet towel or a warm gel pack on your face 3 or 4 times a day for 5 to 10 minutes each time. · Try a decongestant nasal spray like oxymetazoline (Afrin). Do not use it for more than 3 days in a row. Using it for more than 3 days can make your congestion worse. When should you call for help? Call your doctor now or seek immediate medical care if: 
  · You have new or worse swelling or redness in your face or around your eyes.  
  · You have a new or higher fever.  
 Watch closely for changes in your health, and be sure to contact your doctor if: 
  · You have new or worse facial pain.  
  · The mucus from your nose becomes thicker (like pus) or has new blood in it.  
  · You are not getting better as expected. Where can you learn more? Go to http://pepe-lolita.info/. Enter A406 in the search box to learn more about \"Sinusitis: Care Instructions. \" Current as of: March 27, 2018 Content Version: 11.9 © 5468-2781 [employer]. Care instructions adapted under license by [employer] (which disclaims liability or warranty for this information). If you have questions about a medical condition or this instruction, always ask your healthcare professional. [address] any warranty or liability for your use of this information.

## 2019-03-01 RX ORDER — DOXYCYCLINE HYCLATE 100 MG
100 TABLET ORAL 2 TIMES DAILY
Qty: 20 TAB | Refills: 0 | Status: SHIPPED | OUTPATIENT
Start: 2019-03-01 | End: 2019-07-24 | Stop reason: SDUPTHER

## 2019-03-01 NOTE — TELEPHONE ENCOUNTER
Pharmacy can not fill doxycycline as Adoxa they do have Doxycycline Hyclate can you resubmit for this .

## 2019-05-06 ENCOUNTER — OFFICE VISIT (OUTPATIENT)
Dept: FAMILY MEDICINE CLINIC | Age: 59
End: 2019-05-06

## 2019-05-06 VITALS
OXYGEN SATURATION: 98 % | SYSTOLIC BLOOD PRESSURE: 138 MMHG | RESPIRATION RATE: 18 BRPM | DIASTOLIC BLOOD PRESSURE: 90 MMHG | BODY MASS INDEX: 48.9 KG/M2 | TEMPERATURE: 98.3 F | WEIGHT: 276 LBS | HEART RATE: 68 BPM | HEIGHT: 63 IN

## 2019-05-06 DIAGNOSIS — I10 ESSENTIAL HYPERTENSION: ICD-10-CM

## 2019-05-06 DIAGNOSIS — E55.9 HYPOVITAMINOSIS D: ICD-10-CM

## 2019-05-06 DIAGNOSIS — H69.82 EUSTACHIAN TUBE DYSFUNCTION, LEFT: Primary | ICD-10-CM

## 2019-05-06 RX ORDER — PSEUDOEPHEDRINE HCL 120 MG/1
120 TABLET, FILM COATED, EXTENDED RELEASE ORAL
Qty: 30 TAB | Refills: 0 | Status: SHIPPED | OUTPATIENT
Start: 2019-05-06

## 2019-05-06 NOTE — PATIENT INSTRUCTIONS
High Blood Pressure: Care Instructions Overview It's normal for blood pressure to go up and down throughout the day. But if it stays up, you have high blood pressure. Another name for high blood pressure is hypertension. Despite what a lot of people think, high blood pressure usually doesn't cause headaches or make you feel dizzy or lightheaded. It usually has no symptoms. But it does increase your risk of stroke, heart attack, and other problems. You and your doctor will talk about your risks of these problems based on your blood pressure. Your doctor will give you a goal for your blood pressure. Your goal will be based on your health and your age. Lifestyle changes, such as eating healthy and being active, are always important to help lower blood pressure. You might also take medicine to reach your blood pressure goal. 
Follow-up care is a key part of your treatment and safety. Be sure to make and go to all appointments, and call your doctor if you are having problems. It's also a good idea to know your test results and keep a list of the medicines you take. How can you care for yourself at home? Medical treatment · If you stop taking your medicine, your blood pressure will go back up. You may take one or more types of medicine to lower your blood pressure. Be safe with medicines. Take your medicine exactly as prescribed. Call your doctor if you think you are having a problem with your medicine. · Talk to your doctor before you start taking aspirin every day. Aspirin can help certain people lower their risk of a heart attack or stroke. But taking aspirin isn't right for everyone, because it can cause serious bleeding. · See your doctor regularly. You may need to see the doctor more often at first or until your blood pressure comes down. · If you are taking blood pressure medicine, talk to your doctor before you take decongestants or anti-inflammatory medicine, such as ibuprofen. Some of these medicines can raise blood pressure. · Learn how to check your blood pressure at home. Lifestyle changes · Stay at a healthy weight. This is especially important if you put on weight around the waist. Losing even 10 pounds can help you lower your blood pressure. · If your doctor recommends it, get more exercise. Walking is a good choice. Bit by bit, increase the amount you walk every day. Try for at least 30 minutes on most days of the week. You also may want to swim, bike, or do other activities. · Avoid or limit alcohol. Talk to your doctor about whether you can drink any alcohol. · Try to limit how much sodium you eat to less than 2,300 milligrams (mg) a day. Your doctor may ask you to try to eat less than 1,500 mg a day. · Eat plenty of fruits (such as bananas and oranges), vegetables, legumes, whole grains, and low-fat dairy products. · Lower the amount of saturated fat in your diet. Saturated fat is found in animal products such as milk, cheese, and meat. Limiting these foods may help you lose weight and also lower your risk for heart disease. · Do not smoke. Smoking increases your risk for heart attack and stroke. If you need help quitting, talk to your doctor about stop-smoking programs and medicines. These can increase your chances of quitting for good. When should you call for help? Call 911 anytime you think you may need emergency care. This may mean having symptoms that suggest that your blood pressure is causing a serious heart or blood vessel problem. Your blood pressure may be over 180/120. 
 For example, call 911 if: 
  · You have symptoms of a heart attack. These may include: 
? Chest pain or pressure, or a strange feeling in the chest. 
? Sweating. ? Shortness of breath. ? Nausea or vomiting. ? Pain, pressure, or a strange feeling in the back, neck, jaw, or upper belly or in one or both shoulders or arms. ? Lightheadedness or sudden weakness. ? A fast or irregular heartbeat.  
  · You have symptoms of a stroke. These may include: 
? Sudden numbness, tingling, weakness, or loss of movement in your face, arm, or leg, especially on only one side of your body. ? Sudden vision changes. ? Sudden trouble speaking. ? Sudden confusion or trouble understanding simple statements. ? Sudden problems with walking or balance. ? A sudden, severe headache that is different from past headaches.  
  · You have severe back or belly pain.  
 Do not wait until your blood pressure comes down on its own. Get help right away. 
 Call your doctor now or seek immediate care if: 
  · Your blood pressure is much higher than normal (such as 180/120 or higher), but you don't have symptoms.  
  · You think high blood pressure is causing symptoms, such as: 
? Severe headache. 
? Blurry vision.  
 Watch closely for changes in your health, and be sure to contact your doctor if: 
  · Your blood pressure measures higher than your doctor recommends at least 2 times. That means the top number is higher or the bottom number is higher, or both.  
  · You think you may be having side effects from your blood pressure medicine. Where can you learn more? Go to http://pepe-lolita.info/. Enter V708 in the search box to learn more about \"High Blood Pressure: Care Instructions. \" Current as of: July 22, 2018 Content Version: 11.9 © 8301-0758 [employer]wise, Incorporated. Care instructions adapted under license by [email or web address] (which disclaims liability or warranty for this information). If you have questions about a medical condition or this instruction, always ask your healthcare professional. [place] [address] any warranty or liability for your use of this information. Eustachian Tube Problems: Care Instructions Your Care Instructions The eustachian (say \"you-STAY-shee-un\") tubes run between the inside of the ears and the throat. They keep air pressure stable in the ears. If your eustachian tubes become blocked, the air pressure in your ears changes. The fluids from a cold can clog eustachian tubes, causing pain in the ears. A quick change in air pressure can cause eustachian tubes to close up. This might happen when an airplane changes altitude or when a  goes up or down underwater. Eustachian tube problems often clear up on their own or after antibiotic treatment. If your tubes continue to be blocked, you may need surgery. Follow-up care is a key part of your treatment and safety. Be sure to make and go to all appointments, and call your doctor if you are having problems. It's also a good idea to know your test results and keep a list of the medicines you take. How can you care for yourself at home? · To ease ear pain, apply a warm washcloth or a heating pad set on low. There may be some drainage from the ear when the heat melts earwax. Put a cloth between the heat source and your skin. Do not use a heating pad with children. · If your doctor prescribed antibiotics, take them as directed. Do not stop taking them just because you feel better. You need to take the full course of antibiotics. · Your doctor may recommend over-the-counter medicine. Be safe with medicines. Oral or nasal decongestants may relieve ear pain. Avoid decongestants that are combined with antihistamines, which tend to cause more blockage. But if allergies seem to be the problem, your doctor may recommend a combination. Be careful with cough and cold medicines. Don't give them to children younger than 6, because they don't work for children that age and can even be harmful. For children 6 and older, always follow all the instructions carefully. Make sure you know how much medicine to give and how long to use it. And use the dosing device if one is included. When should you call for help? Call your doctor now or seek immediate medical care if: 
  · You develop sudden, complete hearing loss.  
  · You have severe pain or feel dizzy.  
  · You have new or increasing pus or blood draining from your ear.  
  · You have redness, swelling, or pain around or behind the ear.  
 Watch closely for changes in your health, and be sure to contact your doctor if: 
  · You do not get better after 2 weeks.  
  · You have any new symptoms, such as itching or a feeling of fullness in the ear. Where can you learn more? Go to http://pepe-lolita.info/. Enter Y822 in the search box to learn more about \"Eustachian Tube Problems: Care Instructions. \" Current as of: March 27, 2018 Content Version: 11.9 © 0224-7021 [employer]. Care instructions adapted under license by [employer] (which disclaims liability or warranty for this information). If you have questions about a medical condition or this instruction, always ask your healthcare professional. [address] any warranty or liability for your use of this information.

## 2019-05-06 NOTE — PROGRESS NOTES
Assessment/Plan: *Diagnoses and all orders for this visit: 1. Eustachian tube dysfunction, left 2. Essential hypertension 
-     CBC WITH AUTOMATED DIFF; Future 
-     HEPATIC FUNCTION PANEL; Future -     LIPID PANEL; Future -     METABOLIC PANEL, BASIC; Future 
-     TSH 3RD GENERATION; Future -     T4, FREE; Future -     URINALYSIS W/ RFLX MICROSCOPIC; Future 3. Hypovitaminosis D 
-     VITAMIN D, 25 HYDROXY; Future Other orders -     pseudoephedrine CR (SUDAFED 12 HOUR) 120 mg CR tablet; Take 1 Tab by mouth daily as needed for Congestion. Physical in 3-4 weeks. Will do labs today. The plan was discussed with the patient. The patient verbalized understanding and is in agreement with the plan. All medication potential side effects were discussed with the patient. 
 
------------------------------------------------------------------------------------------------------------------- Phyllis Alexandra is a 63 y.o. female and presents with Sore Throat; Ear Pain (left ); and Leg Swelling (has swelling in right thigh . seeing orthopedic for knee ) Subjective: 
Pt here for swelling in the LT neck, x 5 days with crackling in the LT ear. Denies feeling sick but does have a difficult time with allergy season. Is on Singulair and has the best results with this. Has also had RT knee pain. Was seen at [facility] and was evaluated, had xrays of the RT knee. Was told she had arthritis. Was referred to [facility] (Dr. Vira Martinez) and he gave her Voltaren gel to start with Tylenol. Then 2 weeks after that, she received a CS injection. She still has pain, can not walk without a brace and the pain has returned, anstsrill has swelling. ROS: 
Review of Systems - Negative except as above The problem list was updated as a part of today's visit. Patient Active Problem List  
Diagnosis Code  Asthma J45.909  
 HTN (hypertension) I10  
  Irregular menstrual cycle N92.6  Anemia D64.9  Anxiety F41.9  Degenerative arthritis of left knee M17.12  Noncompliance with medication regimen Z91.14  
 Obesity, morbid (HCC) E66.01 The PSH, FH were reviewed. SH: Social History Tobacco Use  Smoking status: Never Smoker  Smokeless tobacco: Never Used Substance Use Topics  Alcohol use: No  
 Drug use: No  
 
 
Medications/Allergies: 
Current Outpatient Medications on File Prior to Visit Medication Sig Dispense Refill  montelukast (SINGULAIR) 10 mg tablet Take 1 Tab by mouth daily. 90 Tab 2  cetirizine (ZYRTEC) 10 mg tablet Take 1 Tab by mouth daily. 90 Tab 2  
 amLODIPine (NORVASC) 5 mg tablet Take 1 Tab by mouth daily. 90 Tab 1  
 lisinopril-hydroCHLOROthiazide (PRINZIDE, ZESTORETIC) 20-12.5 mg per tablet Take 2 Tabs by mouth daily. 180 Tab 1  
 fluticasone-salmeterol (ADVAIR DISKUS) 250-50 mcg/dose diskus inhaler Take 1 Puff by inhalation every twelve (12) hours. 3 Inhaler 2  
 azelastine (ASTELIN) 137 mcg (0.1 %) nasal spray 1 [place] by Both Nostrils route two (2) times a day. Use in each nostril as directed 1 Bottle 1  
 ibuprofen (MOTRIN) 800 mg tablet Take 1 Tab by mouth every eight (8) hours as needed for Pain. 90 Tab 1  
 albuterol (PROVENTIL HFA, VENTOLIN HFA, PROAIR HFA) 90 mcg/actuation inhaler Take 2 Puffs by inhalation every six (6) hours as needed for Wheezing. 2 Inhaler 1  
 doxycycline (VIBRA-TABS) 100 mg tablet Take 1 Tab by mouth two (2) times a day. (Doxy hyclate) 20 Tab 0  
 benzonatate (TESSALON) 200 mg capsule Take 1 Cap by mouth three (3) times daily as needed for Cough. 30 Cap 0 No current facility-administered medications on file prior to visit. Allergies Allergen Reactions  Pcn [Penicillins] Rash Health Maintenance:  
Health Maintenance Topic Date Due  
 COLONOSCOPY  07/09/1979  Shingrix Vaccine Age 50> (1 of 2) 07/09/2011  Influenza Age 5 to Adult  08/01/2019  BREAST CANCER SCRN MAMMOGRAM  10/16/2019  PAP AKA CERVICAL CYTOLOGY  10/11/2020  
 DTaP/Tdap/Td series (2 - Td) 01/06/2024  Bone Densitometry (Dexa) Screening  07/09/2026  Pneumococcal 0-64 years  Completed Objective: 
Visit Vitals /90 Pulse 68 Temp 98.3 °F (36.8 °C) (Oral) Resp 18 Ht 5' 2.6\" (1.59 m) Wt 276 lb (125.2 kg) SpO2 98% BMI 49.52 kg/m² Nurses notes and VS reviewed. Physical Examination: General appearance - alert, well appearing, and in no distress Neck - + slight swelling and TTP along LT neck Chest - clear to auscultation, no wheezes, rales or rhonchi, symmetric air entry Heart - normal rate, regular rhythm, normal S1, S2, no murmurs, rubs, clicks or gallops Musculoskeletal - abnormal exam of right knee: + swelling, limping, pain with weight bearing, +TTP along medial aspect of joint. Labwork and Ancillary Studies: CBC w/Diff Lab Results Component Value Date/Time WBC 3.9 (L) 08/04/2017 11:15 AM  
 HGB 13.4 08/04/2017 11:15 AM  
 PLATELET 725 12/88/4760 11:15 AM  
  
 
 Basic Metabolic Profile Lab Results Component Value Date/Time Sodium 142 08/04/2017 11:15 AM  
 Potassium 4.2 08/04/2017 11:15 AM  
 Chloride 104 08/04/2017 11:15 AM  
 CO2 30 08/04/2017 11:15 AM  
 Anion gap 8 08/04/2017 11:15 AM  
 Glucose 102 (H) 08/04/2017 11:15 AM  
 BUN 9 08/04/2017 11:15 AM  
 Creatinine 0.64 08/04/2017 11:15 AM  
 BUN/Creatinine ratio 14 08/04/2017 11:15 AM  
 GFR est AA >60 08/04/2017 11:15 AM  
 GFR est non-AA >60 08/04/2017 11:15 AM  
 Calcium 9.0 08/04/2017 11:15 AM  
  
  
LFT Lab Results Component Value Date/Time ALT (SGPT) 21 08/04/2017 11:15 AM  
 AST (SGOT) 14 (L) 08/04/2017 11:15 AM  
 Alk. phosphatase 63 08/04/2017 11:15 AM  
 Bilirubin, direct 0.1 08/04/2017 11:15 AM  
 Bilirubin, total 0.4 08/04/2017 11:15 AM  
 
 
 
Cholesterol Lab Results Component Value Date/Time Cholesterol, total 164 08/04/2017 11:15 AM  
 HDL Cholesterol 55 08/04/2017 11:15 AM  
 LDL, calculated 95.2 08/04/2017 11:15 AM  
 Triglyceride 69 08/04/2017 11:15 AM  
 CHOL/HDL Ratio 3.0 08/04/2017 11:15 AM

## 2019-05-06 NOTE — PROGRESS NOTES
Taylor Arambula is a 63 y.o. female (: 1960) presenting to address: Chief Complaint Patient presents with  Sore Throat  Ear Pain  
  left  Leg Swelling  
  has swelling in right thigh . seeing orthopedic for knee Vitals:  
 19 [id number] BP: 140/90 Pulse: 68 Resp: 18 Temp: 98.3 °F (36.8 °C) TempSrc: Oral  
SpO2: 98% Weight: 276 lb (125.2 kg) Height: 5' 2.6\" (1.59 m) PainSc:   6 PainLoc: Throat Hearing/Vision: No exam data present Learning Assessment:  
 
Learning Assessment 2015 PRIMARY LEARNER Patient HIGHEST LEVEL OF EDUCATION - PRIMARY LEARNER  4 YEARS OF COLLEGE  
BARRIERS PRIMARY LEARNER NONE  
CO-LEARNER CAREGIVER No  
PRIMARY LANGUAGE ENGLISH  
LEARNER PREFERENCE PRIMARY LISTENING  
ANSWERED BY self RELATIONSHIP SELF Depression Screening:  
 
3 most recent PHQ Screens 2019 Little interest or pleasure in doing things Not at all Feeling down, depressed, irritable, or hopeless Not at all Total Score PHQ 2 0 Fall Risk Assessment:  
 
Fall Risk Assessment, last 12 mths 2019 Able to walk? Yes Fall in past 12 months? No  
 
Abuse Screening:  
 
Abuse Screening Questionnaire 2014 Do you ever feel afraid of your partner? Tj Stone Are you in a relationship with someone who physically or mentally threatens you? Tj Stone Is it safe for you to go home? [facility] Coordination of Care Questionaire: 1. Have you been to the ER, urgent care clinic since your last visit? Hospitalized since your last visit? NO 
 
2. Have you seen or consulted any other health care providers outside of the [address] [place] [address] since your last visit? Include any pap smears or colon screening. YES Orthopedic for knee Advanced Directive: 1. Do you have an Advanced Directive? NO 
 
2. Would you like information on Advanced Directives?  NO

## 2019-05-07 LAB
25(OH)D3+25(OH)D2 SERPL-MCNC: 31 NG/ML (ref 30–100)
ALBUMIN SERPL-MCNC: 3.6 G/DL (ref 3.5–5.5)
ALP SERPL-CCNC: 70 IU/L (ref 39–117)
ALT SERPL-CCNC: 17 IU/L (ref 0–32)
APPEARANCE UR: CLEAR
AST SERPL-CCNC: 14 IU/L (ref 0–40)
BASOPHILS # BLD AUTO: 0 X10E3/UL (ref 0–0.2)
BASOPHILS NFR BLD AUTO: 0 %
BILIRUB DIRECT SERPL-MCNC: 0.12 MG/DL (ref 0–0.4)
BILIRUB SERPL-MCNC: 0.4 MG/DL (ref 0–1.2)
BILIRUB UR QL STRIP: NEGATIVE
BUN SERPL-MCNC: 9 MG/DL (ref 6–24)
BUN/CREAT SERPL: 16 (ref 9–23)
CALCIUM SERPL-MCNC: 9.5 MG/DL (ref 8.7–10.2)
CHLORIDE SERPL-SCNC: 105 MMOL/L (ref 96–106)
CHOLEST SERPL-MCNC: 193 MG/DL (ref 100–199)
CO2 SERPL-SCNC: 23 MMOL/L (ref 20–29)
COLOR UR: YELLOW
CREAT SERPL-MCNC: 0.57 MG/DL (ref 0.57–1)
EOSINOPHIL # BLD AUTO: 0.1 X10E3/UL (ref 0–0.4)
EOSINOPHIL NFR BLD AUTO: 3 %
ERYTHROCYTE [DISTWIDTH] IN BLOOD BY AUTOMATED COUNT: 15.3 % (ref 12.3–15.4)
GLUCOSE SERPL-MCNC: 103 MG/DL (ref 65–99)
GLUCOSE UR QL: NEGATIVE
HCT VFR BLD AUTO: 41.4 % (ref 34–46.6)
HDLC SERPL-MCNC: 53 MG/DL
HGB BLD-MCNC: 13.6 G/DL (ref 11.1–15.9)
HGB UR QL STRIP: NEGATIVE
IMM GRANULOCYTES # BLD AUTO: 0 X10E3/UL (ref 0–0.1)
IMM GRANULOCYTES NFR BLD AUTO: 0 %
INTERPRETATION, 910389: NORMAL
KETONES UR QL STRIP: NEGATIVE
LDLC SERPL CALC-MCNC: 126 MG/DL (ref 0–99)
LEUKOCYTE ESTERASE UR QL STRIP: NEGATIVE
LYMPHOCYTES # BLD AUTO: 1.7 X10E3/UL (ref 0.7–3.1)
LYMPHOCYTES NFR BLD AUTO: 36 %
MCH RBC QN AUTO: 30.4 PG (ref 26.6–33)
MCHC RBC AUTO-ENTMCNC: 32.9 G/DL (ref 31.5–35.7)
MCV RBC AUTO: 93 FL (ref 79–97)
MICRO URNS: NORMAL
MONOCYTES # BLD AUTO: 0.4 X10E3/UL (ref 0.1–0.9)
MONOCYTES NFR BLD AUTO: 9 %
NEUTROPHILS # BLD AUTO: 2.4 X10E3/UL (ref 1.4–7)
NEUTROPHILS NFR BLD AUTO: 52 %
NITRITE UR QL STRIP: NEGATIVE
PH UR STRIP: 7.5 [PH] (ref 5–7.5)
PLATELET # BLD AUTO: 275 X10E3/UL (ref 150–379)
POTASSIUM SERPL-SCNC: 4.2 MMOL/L (ref 3.5–5.2)
PROT SERPL-MCNC: 7.1 G/DL (ref 6–8.5)
PROT UR QL STRIP: NEGATIVE
RBC # BLD AUTO: 4.47 X10E6/UL (ref 3.77–5.28)
SODIUM SERPL-SCNC: 143 MMOL/L (ref 134–144)
SP GR UR: 1.01 (ref 1–1.03)
T4 FREE SERPL-MCNC: 1.22 NG/DL (ref 0.82–1.77)
TRIGL SERPL-MCNC: 70 MG/DL (ref 0–149)
TSH SERPL DL<=0.005 MIU/L-ACNC: 0.8 UIU/ML (ref 0.45–4.5)
UROBILINOGEN UR STRIP-MCNC: 0.2 MG/DL (ref 0.2–1)
VLDLC SERPL CALC-MCNC: 14 MG/DL (ref 5–40)
WBC # BLD AUTO: 4.6 X10E3/UL (ref 3.4–10.8)

## 2019-05-31 ENCOUNTER — OFFICE VISIT (OUTPATIENT)
Dept: FAMILY MEDICINE CLINIC | Age: 59
End: 2019-05-31

## 2019-05-31 VITALS
WEIGHT: 271 LBS | OXYGEN SATURATION: 97 % | SYSTOLIC BLOOD PRESSURE: 136 MMHG | HEIGHT: 63 IN | DIASTOLIC BLOOD PRESSURE: 90 MMHG | RESPIRATION RATE: 16 BRPM | BODY MASS INDEX: 48.02 KG/M2 | HEART RATE: 76 BPM | TEMPERATURE: 98.5 F

## 2019-05-31 DIAGNOSIS — Z12.39 BREAST CANCER SCREENING: ICD-10-CM

## 2019-05-31 DIAGNOSIS — I10 ESSENTIAL HYPERTENSION: ICD-10-CM

## 2019-05-31 DIAGNOSIS — N95.0 POSTMENOPAUSAL BLEEDING: ICD-10-CM

## 2019-05-31 DIAGNOSIS — Z00.00 ANNUAL PHYSICAL EXAM: Primary | ICD-10-CM

## 2019-05-31 NOTE — PROGRESS NOTES
Geoffrey Lawrence is a 63 y.o. female (: 1960) presenting to address:    Chief Complaint   Patient presents with    Complete Physical       Vitals:    19 0856   BP: (!) 130/98   Pulse: 76   Resp: 16   Temp: 98.5 °F (36.9 °C)   TempSrc: Oral   SpO2: 97%   Weight: 271 lb (122.9 kg)   Height: 5' 2.6\" (1.59 m)   PainSc:   2   PainLoc: Knee       Hearing/Vision:      Visual Acuity Screening    Right eye Left eye Both eyes   Without correction:      With correction: 20/30 20/40 20/40       Learning Assessment:     Learning Assessment 2015   PRIMARY LEARNER Patient   HIGHEST LEVEL OF EDUCATION - PRIMARY LEARNER  4 YEARS OF COLLEGE   BARRIERS PRIMARY LEARNER NONE   CO-LEARNER CAREGIVER No   PRIMARY LANGUAGE ENGLISH   LEARNER PREFERENCE PRIMARY LISTENING   ANSWERED BY self   RELATIONSHIP SELF     Depression Screening:     3 most recent PHQ Screens 2019   Little interest or pleasure in doing things Not at all   Feeling down, depressed, irritable, or hopeless Not at all   Total Score PHQ 2 0     Fall Risk Assessment:     Fall Risk Assessment, last 12 mths 2019   Able to walk? Yes   Fall in past 12 months? No     Abuse Screening:     Abuse Screening Questionnaire 2019   Do you ever feel afraid of your partner? N   Are you in a relationship with someone who physically or mentally threatens you? N   Is it safe for you to go home? Y     Coordination of Care Questionaire:   1. Have you been to the ER, urgent care clinic since your last visit? Hospitalized since your last visit? NO    2. Have you seen or consulted any other health care providers outside of the [address] [place] [address] since your last visit? Include any pap smears or colon screening. YES Ortho for knee     Advanced Directive:   1. Do you have an Advanced Directive? NO    2. Would you like information on Advanced Directives?  NO

## 2019-05-31 NOTE — PATIENT INSTRUCTIONS
Well Visit, Women 48 to 72: Care Instructions  Your Care Instructions    Physical exams can help you stay healthy. Your doctor has checked your overall health and may have suggested ways to take good care of yourself. He or she also may have recommended tests. At home, you can help prevent illness with healthy eating, regular exercise, and other steps. Follow-up care is a key part of your treatment and safety. Be sure to make and go to all appointments, and call your doctor if you are having problems. It's also a good idea to know your test results and keep a list of the medicines you take. How can you care for yourself at home? · Reach and stay at a healthy weight. This will lower your risk for many problems, such as obesity, diabetes, heart disease, and high blood pressure. · Get at least 30 minutes of exercise on most days of the week. Walking is a good choice. You also may want to do other activities, such as running, swimming, cycling, or playing tennis or team sports. · Do not smoke. Smoking can make health problems worse. If you need help quitting, talk to your doctor about stop-smoking programs and medicines. These can increase your chances of quitting for good. · Protect your skin from too much sun. When you're outdoors from 10 a.m. to 4 p.m., stay in the shade or cover up with clothing and a hat with a wide brim. Wear sunglasses that block UV rays. Even when it's cloudy, put broad-spectrum sunscreen (SPF 30 or higher) on any exposed skin. · See a dentist one or two times a year for checkups and to have your teeth cleaned. · Wear a seat belt in the car. · Limit alcohol to 1 drink a day. Too much alcohol can cause health problems. Follow your doctor's advice about when to have certain tests. These tests can spot problems early. · Cholesterol.  Your doctor will tell you how often to have this done based on your age, family history, or other things that can increase your risk for heart attack and stroke. · Blood pressure. Have your blood pressure checked during a routine doctor visit. Your doctor will tell you how often to check your blood pressure based on your age, your blood pressure results, and other factors. · Mammogram. Ask your doctor how often you should have a mammogram, which is an X-ray of your breasts. A mammogram can spot breast cancer before it can be felt and when it is easiest to treat. · Pap test and pelvic exam. Ask your doctor how often you should have a Pap test. You may not need to have a Pap test as often as you used to. · Vision. Have your eyes checked every year or two or as often as your doctor suggests. Some experts recommend that you have yearly exams for glaucoma and other age-related eye problems starting at age 48. · Hearing. Tell your doctor if you notice any change in your hearing. You can have tests to find out how well you hear. · Diabetes. Ask your doctor whether you should have tests for diabetes. · Colon cancer. You should begin tests for colon cancer at age 48. You may have one of several tests. Your doctor will tell you how often to have tests based on your age and risk. Risks include whether you already had a precancerous polyp removed from your colon or whether your parents, sisters and brothers, or children have had colon cancer. · Thyroid disease. Talk to your doctor about whether to have your thyroid checked as part of a regular physical exam. Women have an increased chance of a thyroid problem. · Osteoporosis. You should begin tests for bone density at age 72. If you are younger than 72, ask your doctor whether you have factors that may increase your risk for this disease. You may want to have this test before age 72. · Heart attack and stroke risk. At least every 4 to 6 years, you should have your risk for heart attack and stroke assessed.  Your doctor uses factors such as your age, blood pressure, cholesterol, and whether you smoke or have diabetes to show what your risk for a heart attack or stroke is over the next 10 years. When should you call for help? Watch closely for changes in your health, and be sure to contact your doctor if you have any problems or symptoms that concern you. Where can you learn more? Go to http://pepe-lolita.info/. Enter B636 in the search box to learn more about \"Well Visit, Women 50 to 72: Care Instructions. \"  Current as of: March 28, 2018  Content Version: 11.9  © 8353-5592 [employer]. Care instructions adapted under license by [employer] (which disclaims liability or warranty for this information). If you have questions about a medical condition or this instruction, always ask your healthcare professional. [address] any warranty or liability for your use of this information.

## 2019-05-31 NOTE — PROGRESS NOTES
SUBJECTIVE:   63 y.o. female for annual routine checkup. HTN: was better on last visit. She is taking all her meds. Obesity: has lost 10 lbs over the last 3 months. Is working on getting it lower. Has been to Ortho and is addressing the RT knee pain. Wears a brace and they are mentioning surgery but she is not eager to do this. Current Outpatient Medications   Medication Sig Dispense Refill    pseudoephedrine CR (SUDAFED 12 HOUR) 120 mg CR tablet Take 1 Tab by mouth daily as needed for Congestion. 30 Tab 0    doxycycline (VIBRA-TABS) 100 mg tablet Take 1 Tab by mouth two (2) times a day. (Doxy hyclate) 20 Tab 0    montelukast (SINGULAIR) 10 mg tablet Take 1 Tab by mouth daily. 90 Tab 2    cetirizine (ZYRTEC) 10 mg tablet Take 1 Tab by mouth daily. 90 Tab 2    amLODIPine (NORVASC) 5 mg tablet Take 1 Tab by mouth daily. 90 Tab 1    lisinopril-hydroCHLOROthiazide (PRINZIDE, ZESTORETIC) 20-12.5 mg per tablet Take 2 Tabs by mouth daily. 180 Tab 1    fluticasone-salmeterol (ADVAIR DISKUS) 250-50 mcg/dose diskus inhaler Take 1 Puff by inhalation every twelve (12) hours. 3 Inhaler 2    azelastine (ASTELIN) 137 mcg (0.1 %) nasal spray 1 [place] by Both Nostrils route two (2) times a day. Use in each nostril as directed 1 Bottle 1    ibuprofen (MOTRIN) 800 mg tablet Take 1 Tab by mouth every eight (8) hours as needed for Pain. 90 Tab 1    albuterol (PROVENTIL HFA, VENTOLIN HFA, PROAIR HFA) 90 mcg/actuation inhaler Take 2 Puffs by inhalation every six (6) hours as needed for Wheezing. 2 Inhaler 1       Past Medical History:   Diagnosis Date    Anemia 1/4/2011    Anxiety 12/3/2012    Asthma     Degenerative arthritis of left knee 5/28/2013    Hypothyroidism     hypo     Noncompliance with medication regimen 4/14/2017    Thyroid disease     hypo       Allergies: Pcn [penicillins]   No LMP recorded. (Menstrual status: Menopause). ROS:  Feeling well. No dyspnea or chest pain on exertion.   No abdominal pain, change in bowel habits, black or bloody stools. No urinary tract symptoms. GYN ROS: no breast pain or new or enlarging lumps on self exam. No neurological complaints. OBJECTIVE:   The patient appears well, alert, oriented x 3, in no distress. Visit Vitals  /90 (BP 1 Location: Left arm, BP Patient Position: Sitting)   Pulse 76   Temp 98.5 °F (36.9 °C) (Oral)   Resp 16   Ht 5' 2.6\" (1.59 m)   Wt 271 lb (122.9 kg)   SpO2 97%   BMI 48.62 kg/m²       General appearance: alert, cooperative, no distress, appears stated age  Head: Normocephalic, without obvious abnormality, atraumatic  Ears: normal TM's and external ear canals AU  Throat: Lips, mucosa, and tongue normal. Teeth and gums normal  Neck: supple, symmetrical, trachea midline, no adenopathy, thyroid: not enlarged, symmetric, no tenderness/mass/nodules, no carotid bruit and no JVD  Back: symmetric, no curvature. ROM normal. No CVA tenderness. Lungs: clear to auscultation bilaterally  Breasts: patient declines to have breast exam.  Heart: regular rate and rhythm, S1, S2 normal, no murmur, click, rub or gallop  Abdomen: soft, non-tender. Bowel sounds normal. No masses,  no organomegaly  Extremities: extremities normal, atraumatic, no cyanosis or edema  Pulses: 2+ and symmetric  Skin: Skin color, texture, turgor normal. No rashes or lesions  Neurological is normal, no focal findings.            Lab Results   Component Value Date/Time    WBC 4.6 05/06/2019 10:30 AM    HGB 13.6 05/06/2019 10:30 AM    HCT 41.4 05/06/2019 10:30 AM    PLATELET 384 68/46/5518 10:30 AM    MCV 93 05/06/2019 10:30 AM         Lab Results   Component Value Date/Time    Sodium 143 05/06/2019 10:30 AM    Potassium 4.2 05/06/2019 10:30 AM    Chloride 105 05/06/2019 10:30 AM    CO2 23 05/06/2019 10:30 AM    Anion gap 8 08/04/2017 11:15 AM    Glucose 103 (H) 05/06/2019 10:30 AM    BUN 9 05/06/2019 10:30 AM    Creatinine 0.57 05/06/2019 10:30 AM    BUN/Creatinine ratio 16 05/06/2019 10:30 AM    GFR est  05/06/2019 10:30 AM    GFR est non- 05/06/2019 10:30 AM    Calcium 9.5 05/06/2019 10:30 AM         Lab Results   Component Value Date/Time    ALT (SGPT) 17 05/06/2019 10:30 AM    AST (SGOT) 14 05/06/2019 10:30 AM    Alk. phosphatase 70 05/06/2019 10:30 AM    Bilirubin, direct 0.12 05/06/2019 10:30 AM    Bilirubin, total 0.4 05/06/2019 10:30 AM         Lab Results   Component Value Date/Time    Cholesterol, total 193 05/06/2019 10:30 AM    HDL Cholesterol 53 05/06/2019 10:30 AM    LDL, calculated 126 (H) 05/06/2019 10:30 AM    VLDL, calculated 14 05/06/2019 10:30 AM    Triglyceride 70 05/06/2019 10:30 AM    CHOL/HDL Ratio 3.0 08/04/2017 11:15 AM         Lab Results   Component Value Date/Time    TSH 0.796 05/06/2019 10:30 AM    T4, Free 1.22 05/06/2019 10:30 AM         Lab Results   Component Value Date/Time    Vitamin D 25-Hydroxy 20.8 (L) 08/04/2017 11:15 AM    VITAMIN D, 25-HYDROXY 31.0 05/06/2019 10:30 AM             ASSESSMENT:   Diagnoses and all orders for this visit:    1. Annual physical exam    2. Essential hypertension    3. Breast cancer screening  -     CHAN MAMMO BI SCREENING INCL CAD; Future    4. Postmenopausal bleeding  -     REFERRAL TO OBSTETRICS AND GYNECOLOGY          PLAN:   Mammogram: past due. DEXA: due at eand of year. Colonoscopy: was done, we need to get notes. Pap: will see Gyn.    F/u in 3 months. The plan was discussed with the patient. The patient verbalized understanding and is in agreement with the plan. All medication potential side effects were discussed with the patient.

## 2019-06-24 ENCOUNTER — OFFICE VISIT (OUTPATIENT)
Dept: OBGYN CLINIC | Age: 59
End: 2019-06-24

## 2019-06-24 VITALS
DIASTOLIC BLOOD PRESSURE: 89 MMHG | WEIGHT: 271 LBS | SYSTOLIC BLOOD PRESSURE: 144 MMHG | HEIGHT: 62 IN | HEART RATE: 80 BPM | RESPIRATION RATE: 18 BRPM | BODY MASS INDEX: 49.87 KG/M2

## 2019-06-24 DIAGNOSIS — N95.0 POSTMENOPAUSAL BLEEDING: Primary | ICD-10-CM

## 2019-06-24 NOTE — PROGRESS NOTES
Subjective:      Patient complains of a weeklong episode of vaginal bleeding after having no bleeding for at least 2 years. She also states that she had significant cramping with the bleeding. She denies hot flashes or night sweats and has no other complaints. Current Outpatient Medications   Medication Sig Dispense Refill    pseudoephedrine CR (SUDAFED 12 HOUR) 120 mg CR tablet Take 1 Tab by mouth daily as needed for Congestion. 30 Tab 0    doxycycline (VIBRA-TABS) 100 mg tablet Take 1 Tab by mouth two (2) times a day. (Doxy hyclate) 20 Tab 0    montelukast (SINGULAIR) 10 mg tablet Take 1 Tab by mouth daily. 90 Tab 2    cetirizine (ZYRTEC) 10 mg tablet Take 1 Tab by mouth daily. 90 Tab 2    amLODIPine (NORVASC) 5 mg tablet Take 1 Tab by mouth daily. 90 Tab 1    lisinopril-hydroCHLOROthiazide (PRINZIDE, ZESTORETIC) 20-12.5 mg per tablet Take 2 Tabs by mouth daily. 180 Tab 1    fluticasone-salmeterol (ADVAIR DISKUS) 250-50 mcg/dose diskus inhaler Take 1 Puff by inhalation every twelve (12) hours. 3 Inhaler 2    azelastine (ASTELIN) 137 mcg (0.1 %) nasal spray 1 [place] by Both Nostrils route two (2) times a day. Use in each nostril as directed 1 Bottle 1    ibuprofen (MOTRIN) 800 mg tablet Take 1 Tab by mouth every eight (8) hours as needed for Pain. 90 Tab 1    albuterol (PROVENTIL HFA, VENTOLIN HFA, PROAIR HFA) 90 mcg/actuation inhaler Take 2 Puffs by inhalation every six (6) hours as needed for Wheezing.  2 Inhaler 1     Allergies   Allergen Reactions    Pcn [Penicillins] Rash     Past Medical History:   Diagnosis Date    Anemia 1/4/2011    Anxiety 12/3/2012    Asthma     Degenerative arthritis of left knee 5/28/2013    Hypothyroidism     hypo     Noncompliance with medication regimen 4/14/2017    Thyroid disease     hypo     Past Surgical History:   Procedure Laterality Date    HX GYN      c section x 3    HX ORTHOPAEDIC      REPAIR ACHILLES TENDON,PRIMARY       Family History   Problem Relation Age of Onset    Diabetes Mother      Social History     Tobacco Use    Smoking status: Never Smoker    Smokeless tobacco: Never Used   Substance Use Topics    Alcohol use: No      Review of Systems    A comprehensive review of systems was negative except for that written in the HPI. Objective:     Visit Vitals  /89 (BP 1 Location: Left arm, BP Patient Position: Sitting)   Pulse 80   Resp 18   Ht 5' 2\" (1.575 m)   Wt 271 lb (122.9 kg)   BMI 49.57 kg/m²     General appearance: alert, cooperative, no distress, appears stated age, morbidly obese  Pelvic: External genitalia normal, Vagina normal without discharge, cervix normal in appearance, no CMT, uterus normal size, shape, and consistency, no adnexal masses or tenderness, exam obscured by obesity, rectovaginal septum normal        Assessment/Plan:     Postmenopausal bleeding  FSH ordered to confirm menopausal status  Pelvic ultrasound ordered  Will contact the patient with US results and follow up plan. Plan of care discussed. Patient expressed understanding.

## 2019-06-25 LAB
FSH SERPL-ACNC: 23.5 MIU/ML
LH SERPL-ACNC: 14.8 MIU/ML

## 2019-07-24 ENCOUNTER — OFFICE VISIT (OUTPATIENT)
Dept: FAMILY MEDICINE CLINIC | Age: 59
End: 2019-07-24

## 2019-07-24 VITALS
WEIGHT: 267 LBS | RESPIRATION RATE: 16 BRPM | DIASTOLIC BLOOD PRESSURE: 92 MMHG | HEIGHT: 62 IN | OXYGEN SATURATION: 94 % | SYSTOLIC BLOOD PRESSURE: 146 MMHG | TEMPERATURE: 98.5 F | HEART RATE: 84 BPM | BODY MASS INDEX: 49.13 KG/M2

## 2019-07-24 DIAGNOSIS — J06.9 UPPER RESPIRATORY TRACT INFECTION, UNSPECIFIED TYPE: Primary | ICD-10-CM

## 2019-07-24 RX ORDER — HYDROCODONE POLISTIREX AND CHLORPHENIRAMINE POLISTIREX 10; 8 MG/5ML; MG/5ML
5 SUSPENSION, EXTENDED RELEASE ORAL
Qty: 70 ML | Refills: 0 | Status: SHIPPED | OUTPATIENT
Start: 2019-07-24 | End: 2019-08-07

## 2019-07-24 RX ORDER — DOXYCYCLINE HYCLATE 100 MG
100 TABLET ORAL 2 TIMES DAILY
Qty: 20 TAB | Refills: 0 | Status: SHIPPED | OUTPATIENT
Start: 2019-07-24

## 2019-07-24 NOTE — PROGRESS NOTES
Assessment/Plan:    *Diagnoses and all orders for this visit:    1. Upper respiratory tract infection, unspecified type  -     chlorpheniramine-HYDROcodone (TUSSIONEX) 10-8 mg/5 mL suspension; Take 5 mL by mouth nightly as needed for Cough for up to 14 days. Max Daily Amount: 5 mL. -     doxycycline (VIBRA-TABS) 100 mg tablet; Take 1 Tab by mouth two (2) times a day. (Doxy hyclate)        Routine f/u in late Nov 2019. Will call if not better. The plan was discussed with the patient. The patient verbalized understanding and is in agreement with the plan. All medication potential side effects were discussed with the patient.    -------------------------------------------------------------------------------------------------------------------        Eliane Finnegan is a 63 y.o. female and presents with Sore Throat (x 4 days ); Fever (102 last night ); Generalized Body Aches (bodyaches ); Cough (green mucus ); and Laryngitis         Subjective:  Pt here for illness. She developed cold symptoms 4 days ago. Has hoarseness, + cough+ phlegm green color, +fever yesterday 102, + post nasal drainage, + sinus congestion. HTN: elevated because she has not been well. ROS:  Review of Systems - Negative except as above        The problem list was updated as a part of today's visit. Patient Active Problem List   Diagnosis Code    Asthma J45.909    HTN (hypertension) I10    Irregular menstrual cycle N92.6    Anemia D64.9    Anxiety F41.9    Degenerative arthritis of left knee M17.12    Noncompliance with medication regimen Z91.14    Obesity, morbid (HCC) E66.01       The PSH, FH were reviewed.         SH:  Social History     Tobacco Use    Smoking status: Never Smoker    Smokeless tobacco: Never Used   Substance Use Topics    Alcohol use: No    Drug use: No       Medications/Allergies:  Current Outpatient Medications on File Prior to Visit   Medication Sig Dispense Refill    pseudoephedrine CR (SUDAFED 12 HOUR) 120 mg CR tablet Take 1 Tab by mouth daily as needed for Congestion. 30 Tab 0    cetirizine (ZYRTEC) 10 mg tablet Take 1 Tab by mouth daily. 90 Tab 2    amLODIPine (NORVASC) 5 mg tablet Take 1 Tab by mouth daily. 90 Tab 1    lisinopril-hydroCHLOROthiazide (PRINZIDE, ZESTORETIC) 20-12.5 mg per tablet Take 2 Tabs by mouth daily. 180 Tab 1    fluticasone-salmeterol (ADVAIR DISKUS) 250-50 mcg/dose diskus inhaler Take 1 Puff by inhalation every twelve (12) hours. 3 Inhaler 2    azelastine (ASTELIN) 137 mcg (0.1 %) nasal spray 1 [place] by Both Nostrils route two (2) times a day. Use in each nostril as directed 1 Bottle 1    albuterol (PROVENTIL HFA, VENTOLIN HFA, PROAIR HFA) 90 mcg/actuation inhaler Take 2 Puffs by inhalation every six (6) hours as needed for Wheezing. 2 Inhaler 1    montelukast (SINGULAIR) 10 mg tablet Take 1 Tab by mouth daily. 90 Tab 2    ibuprofen (MOTRIN) 800 mg tablet Take 1 Tab by mouth every eight (8) hours as needed for Pain. 90 Tab 1     No current facility-administered medications on file prior to visit. Allergies   Allergen Reactions    Pcn [Penicillins] Rash         Health Maintenance:   Health Maintenance   Topic Date Due    Shingrix Vaccine Age 49> (1 of 2) 07/09/2011    BREAST CANCER SCRN MAMMOGRAM  10/16/2019    Influenza Age 5 to Adult  08/01/2019    PAP AKA CERVICAL CYTOLOGY  10/11/2020    COLONOSCOPY  09/18/2022    DTaP/Tdap/Td series (2 - Td) 01/06/2024    Bone Densitometry (Dexa) Screening  07/09/2026    Pneumococcal 0-64 years  Completed       Objective:  Visit Vitals  BP (!) 146/92   Pulse 84   Temp 98.5 °F (36.9 °C) (Oral)   Resp 16   Ht 5' 2\" (1.575 m)   Wt 267 lb (121.1 kg)   SpO2 94%   BMI 48.83 kg/m²          Nurses notes and VS reviewed.       Physical Examination: General appearance - ill-appearing  Ears - bilateral TM's and external ear canals normal  Nose - mucosal congestion  Mouth - erythematous  Neck - supple, no significant adenopathy  Chest - rhonchi noted scattered  Heart - normal rate and regular rhythm          Labwork and Ancillary Studies:    CBC w/Diff  Lab Results   Component Value Date/Time    WBC 4.6 05/06/2019 10:30 AM    HGB 13.6 05/06/2019 10:30 AM    PLATELET 439 36/41/7148 10:30 AM         Basic Metabolic Profile  Lab Results   Component Value Date/Time    Sodium 143 05/06/2019 10:30 AM    Potassium 4.2 05/06/2019 10:30 AM    Chloride 105 05/06/2019 10:30 AM    CO2 23 05/06/2019 10:30 AM    Anion gap 8 08/04/2017 11:15 AM    Glucose 103 (H) 05/06/2019 10:30 AM    BUN 9 05/06/2019 10:30 AM    Creatinine 0.57 05/06/2019 10:30 AM    BUN/Creatinine ratio 16 05/06/2019 10:30 AM    GFR est  05/06/2019 10:30 AM    GFR est non- 05/06/2019 10:30 AM    Calcium 9.5 05/06/2019 10:30 AM         LFT  Lab Results   Component Value Date/Time    ALT (SGPT) 17 05/06/2019 10:30 AM    AST (SGOT) 14 05/06/2019 10:30 AM    Alk.  phosphatase 70 05/06/2019 10:30 AM    Bilirubin, direct 0.12 05/06/2019 10:30 AM    Bilirubin, total 0.4 05/06/2019 10:30 AM         Cholesterol  Lab Results   Component Value Date/Time    Cholesterol, total 193 05/06/2019 10:30 AM    HDL Cholesterol 53 05/06/2019 10:30 AM    LDL, calculated 126 (H) 05/06/2019 10:30 AM    Triglyceride 70 05/06/2019 10:30 AM    CHOL/HDL Ratio 3.0 08/04/2017 11:15 AM

## 2019-07-24 NOTE — PATIENT INSTRUCTIONS

## 2019-07-24 NOTE — PROGRESS NOTES
Fabián Dumas is a 63 y.o. female (: 1960) presenting to address:    Chief Complaint   Patient presents with    Sore Throat     x 4 days     Fever     102 last night     Generalized Body Aches     bodyaches     Cough     green mucus        Vitals:    19 1139   BP: (!) 162/92   Pulse: 84   Resp: 16   Temp: 98.5 °F (36.9 °C)   TempSrc: Oral   SpO2: 94%   Weight: 267 lb (121.1 kg)   Height: 5' 2\" (1.575 m)   PainSc:   8   PainLoc: Throat       Hearing/Vision:   No exam data present    Learning Assessment:     Learning Assessment 2015   PRIMARY LEARNER Patient   HIGHEST LEVEL OF EDUCATION - PRIMARY LEARNER  4 YEARS OF COLLEGE   BARRIERS PRIMARY LEARNER NONE   CO-LEARNER CAREGIVER No   PRIMARY LANGUAGE ENGLISH   LEARNER PREFERENCE PRIMARY LISTENING   ANSWERED BY self   RELATIONSHIP SELF     Depression Screening:     3 most recent PHQ Screens 2019   Little interest or pleasure in doing things Not at all   Feeling down, depressed, irritable, or hopeless Not at all   Total Score PHQ 2 0     Fall Risk Assessment:     Fall Risk Assessment, last 12 mths 2019   Able to walk? Yes   Fall in past 12 months? No     Abuse Screening:     Abuse Screening Questionnaire 2019   Do you ever feel afraid of your partner? N   Are you in a relationship with someone who physically or mentally threatens you? N   Is it safe for you to go home? Y     Coordination of Care Questionaire:   1. Have you been to the ER, urgent care clinic since your last visit? Hospitalized since your last visit? NO    2. Have you seen or consulted any other health care providers outside of the [address] [place] [address] since your last visit? Include any pap smears or colon screening. Yes Ortho for knee     Advanced Directive:   1. Do you have an Advanced Directive? NO    2. Would you like information on Advanced Directives?  NO

## 2019-07-26 ENCOUNTER — TELEPHONE (OUTPATIENT)
Dept: FAMILY MEDICINE CLINIC | Age: 59
End: 2019-07-26

## 2019-07-26 NOTE — TELEPHONE ENCOUNTER
Please tell the pt that we need to give the medication proper time to work before we say, it is not doing anything. 3 days is really not enough time. I want her to complete the full course and then we will see how she feels.

## 2019-07-26 NOTE — TELEPHONE ENCOUNTER
Patient called and stated that she is not feeling better. She stated that she is still \"really out of it,\" and is requesting a new antibiotic, because the \"doxycycline is not working. \"

## 2019-07-31 RX ORDER — AZITHROMYCIN 500 MG/1
500 TABLET, FILM COATED ORAL DAILY
Qty: 7 TAB | Refills: 0 | Status: SHIPPED | OUTPATIENT
Start: 2019-07-31 | End: 2019-08-07

## 2019-07-31 NOTE — TELEPHONE ENCOUNTER
The patient called back and stated that she is still not feeling better. She stated that her cough is still bad, and that her chest is still hurting. Please advise.

## 2019-08-24 ENCOUNTER — HOSPITAL ENCOUNTER (OUTPATIENT)
Dept: ULTRASOUND IMAGING | Age: 59
Discharge: HOME | End: 2019-08-24
Attending: OBSTETRICS & GYNECOLOGY
Payer: OTHER GOVERNMENT

## 2019-08-24 DIAGNOSIS — N95.0 POSTMENOPAUSAL BLEEDING: ICD-10-CM

## 2019-08-24 PROCEDURE — 76830 TRANSVAGINAL US NON-OB: CPT

## 2019-09-10 ENCOUNTER — HOSPITAL ENCOUNTER (OUTPATIENT)
Dept: MAMMOGRAPHY | Age: 59
Discharge: HOME | End: 2019-09-10
Attending: INTERNAL MEDICINE
Payer: OTHER GOVERNMENT

## 2019-09-10 DIAGNOSIS — Z12.39 SCREENING FOR BREAST CANCER: ICD-10-CM

## 2019-09-10 PROCEDURE — 77067 SCR MAMMO BI INCL CAD: CPT

## 2019-10-07 ENCOUNTER — HOSPITAL ENCOUNTER (OUTPATIENT)
Dept: LAB | Age: 59
Discharge: HOME | End: 2019-10-07
Payer: OTHER GOVERNMENT

## 2019-10-07 ENCOUNTER — OFFICE VISIT (OUTPATIENT)
Dept: OBGYN CLINIC | Age: 59
End: 2019-10-07

## 2019-10-07 VITALS
RESPIRATION RATE: 18 BRPM | HEART RATE: 82 BPM | WEIGHT: 273 LBS | DIASTOLIC BLOOD PRESSURE: 77 MMHG | HEIGHT: 62 IN | SYSTOLIC BLOOD PRESSURE: 166 MMHG | BODY MASS INDEX: 50.24 KG/M2

## 2019-10-07 DIAGNOSIS — N95.0 POSTMENOPAUSAL BLEEDING: Primary | ICD-10-CM

## 2019-10-07 PROCEDURE — 88305 TISSUE EXAM BY PATHOLOGIST: CPT

## 2019-10-07 NOTE — PROGRESS NOTES
Patient here for EMB secondary to postmenopausal bleeding and a thickened endometrium on ultrasound. She states that she has continued to have spotting since her one episode of a week of heavy bleeding. She has no other complaints. See procedure note.

## 2019-10-07 NOTE — PROCEDURES
[facility]  OFFICE PROCEDURE PROGRESS NOTE        Chart reviewed for the following:   Brittaney MCCORMICK DO, have reviewed the History, Physical and updated the Allergic reactions for Karoline Flower     TIME OUT performed immediately prior to start of procedure:   Brittaney MCCORMICK DO, have performed the following reviews on Karoline Flower prior to the start of the procedure:            * Patient was identified by name and date of birth   * Agreement on procedure being performed was verified  * Risks and Benefits explained to the patient  * Procedure site verified and marked as necessary  * Patient was positioned for comfort  * Consent was signed and verified     Time: 8310    Date of procedure: 10/7/2019    Procedure performed by:  Brittaney Turner DO    Provider assisted by: Geoffrey Haider LPN    Patient assisted by: self    How tolerated by patient: tolerated the procedure well with no complications    Post Procedural Pain Scale: 2 - Hurts Little Bit    Comments: none            Endometrial Biopsy Procedure Note    Endometrial biopsy was performed today. Indications: postmenopausal bleeding    Procedure Details   Urine pregnancy test was not done. The risks (including infection, bleeding, pain, and uterine perforation) and benefits of the procedure were explained to the her and written informed consent was obtained. Antibiotic prophylaxis against endocarditis was not indicated. She was placed in the dorsal lithotomy position. Bimanual exam showed the uterus to be in the neutral position. A speculum inserted in the vagina, and the cervix prepped with povidone iodine. A \"Pipelle endometrial aspirator\" was used to sample the endometrium. Sample was sent for pathologic examination. The patient tolerated the procedure well and there were no complications. Plan:  Ms. Anyi Kumar was advised to call for any fever or for prolonged or severe pain or bleeding.  She was advised to use OTC ibuprofen as needed for mild to moderate pain. She was advised to avoid vaginal intercourse for 48 hours or until the bleeding has completely stopped. My office will get in touch with her as soon as we have the pathology report.

## 2019-10-11 ENCOUNTER — TELEPHONE (OUTPATIENT)
Dept: OBGYN CLINIC | Age: 59
End: 2019-10-11